# Patient Record
Sex: MALE | Race: OTHER | HISPANIC OR LATINO
[De-identification: names, ages, dates, MRNs, and addresses within clinical notes are randomized per-mention and may not be internally consistent; named-entity substitution may affect disease eponyms.]

---

## 2020-04-07 PROBLEM — Z00.00 ENCOUNTER FOR PREVENTIVE HEALTH EXAMINATION: Status: ACTIVE | Noted: 2020-04-07

## 2020-04-10 ENCOUNTER — APPOINTMENT (OUTPATIENT)
Dept: ORTHOPEDIC SURGERY | Facility: CLINIC | Age: 50
End: 2020-04-10
Payer: MEDICAID

## 2020-04-10 VITALS — BODY MASS INDEX: 36.16 KG/M2 | HEIGHT: 66 IN | WEIGHT: 225 LBS

## 2020-04-10 PROCEDURE — 73610 X-RAY EXAM OF ANKLE: CPT | Mod: RT

## 2020-04-10 PROCEDURE — 73562 X-RAY EXAM OF KNEE 3: CPT | Mod: RT

## 2020-04-10 PROCEDURE — 99204 OFFICE O/P NEW MOD 45 MIN: CPT

## 2020-04-10 PROCEDURE — 73590 X-RAY EXAM OF LOWER LEG: CPT | Mod: RT

## 2020-04-13 NOTE — ASSESSMENT
[FreeTextEntry1] : 49 year old male with isolated fibular fracture.  He did not have any medial clear space widening on external rotation stress ankle view and no ankle pain.  This helps rule out Maisonneuve injury.  He will continue protected weight bearing right lower extremity.  He will followup in 3-4 weeks.  He can take NSAIDs as needed for pain.  He knows to call with any questions or concerns or if his symptoms acutely worsen.

## 2020-04-13 NOTE — PHYSICAL EXAM
[de-identified] : General: No acute distress, conversant, well-nourished.\par Head: Normocephalic, atraumatic\par Neck: trachea midline, FROM\par Heart: normotensive and normal rate and rhythm\par Lungs: No labored breathing\par Skin: No abrasions, no rashes, no edema\par Psych: Alert and oriented to person, place and time\par Extremities: no peripheral edema or digital cyanosis\par Vascular: warm and well perfused distally, palpable distal pulses\par \par MSK:\par Right lower extremity\par TTP at lateral aspect of leg\par compartments soft and compressible\par no tenderness at ankle, no tenderness at medial aspect of ankle\par SILT m/l/1st DWS\par +motor EHL/FHL/TA\par WWP distally, palpbale PT and DP pulses [de-identified] : RIght tib/fib AP and lateral obtained in the office today shows comminuted midshaft fibula fracture with interval healing\par \par Right ankle AP, lateral, mortise and external rotation stress views show no fracture or dislocation.  No medial clear space widening.  No change in medial clear space with stress view.  \par \par Knee AP and lateral obtained in the office today shows no fracture or dislocation.  \par \par

## 2020-04-13 NOTE — HISTORY OF PRESENT ILLNESS
[de-identified] : 49 year old male presents for orthopedic evaluation for a right fibular fracture.  He said he tripped in the street 3 weeks ago and sustained the injury. He initially had ankle films at an urgent care and was told he had no fracture.  He returned to the urgent care and had tib/fib films showing an isolated fibula fracture.  The patient currently says he had no ankle pain. He says he has pain in the lateral aspect of his leg in the area of his fibula fracture. He says the pain and swelling have improved.

## 2020-05-26 ENCOUNTER — APPOINTMENT (OUTPATIENT)
Dept: ORTHOPEDIC SURGERY | Facility: CLINIC | Age: 50
End: 2020-05-26
Payer: MEDICAID

## 2020-05-26 PROCEDURE — 99213 OFFICE O/P EST LOW 20 MIN: CPT

## 2020-05-26 PROCEDURE — 73590 X-RAY EXAM OF LOWER LEG: CPT | Mod: RT

## 2020-05-26 PROCEDURE — 73610 X-RAY EXAM OF ANKLE: CPT | Mod: RT

## 2020-05-26 RX ORDER — BACLOFEN 10 MG/1
10 TABLET ORAL
Qty: 30 | Refills: 0 | Status: ACTIVE | COMMUNITY
Start: 2020-02-19

## 2020-05-26 RX ORDER — IBUPROFEN 800 MG/1
800 TABLET, FILM COATED ORAL
Qty: 90 | Refills: 0 | Status: ACTIVE | COMMUNITY
Start: 2020-02-04

## 2020-05-26 RX ORDER — LISINOPRIL 10 MG/1
10 TABLET ORAL
Qty: 30 | Refills: 0 | Status: ACTIVE | COMMUNITY
Start: 2020-01-26

## 2020-05-26 RX ORDER — GABAPENTIN 600 MG/1
600 TABLET, COATED ORAL
Qty: 180 | Refills: 0 | Status: ACTIVE | COMMUNITY
Start: 2020-05-14

## 2020-05-26 RX ORDER — ERGOCALCIFEROL 1.25 MG/1
1.25 MG CAPSULE, LIQUID FILLED ORAL
Qty: 4 | Refills: 0 | Status: ACTIVE | COMMUNITY
Start: 2019-12-03

## 2020-05-26 RX ORDER — CLINDAMYCIN HYDROCHLORIDE 300 MG/1
300 CAPSULE ORAL
Qty: 21 | Refills: 0 | Status: ACTIVE | COMMUNITY
Start: 2020-03-14

## 2020-05-26 RX ORDER — METFORMIN HYDROCHLORIDE 1000 MG/1
1000 TABLET, COATED ORAL
Qty: 60 | Refills: 0 | Status: ACTIVE | COMMUNITY
Start: 2020-01-26

## 2020-05-26 RX ORDER — SYRINGE, DISPOSABLE, 1 ML
25G X 5/8" SYRINGE, EMPTY DISPOSABLE MISCELLANEOUS
Qty: 30 | Refills: 0 | Status: ACTIVE | COMMUNITY
Start: 2019-12-03

## 2020-05-26 RX ORDER — PROPRANOLOL HYDROCHLORIDE 40 MG/1
40 TABLET ORAL
Qty: 60 | Refills: 0 | Status: ACTIVE | COMMUNITY
Start: 2020-01-26

## 2020-05-26 RX ORDER — DULOXETINE HYDROCHLORIDE 60 MG/1
60 CAPSULE, DELAYED RELEASE PELLETS ORAL
Qty: 60 | Refills: 0 | Status: ACTIVE | COMMUNITY
Start: 2020-05-10

## 2020-05-26 RX ORDER — IBUPROFEN 600 MG/1
600 TABLET, FILM COATED ORAL
Qty: 20 | Refills: 0 | Status: ACTIVE | COMMUNITY
Start: 2020-03-15

## 2020-05-26 RX ORDER — OMEPRAZOLE 20 MG/1
20 CAPSULE, DELAYED RELEASE ORAL
Qty: 30 | Refills: 0 | Status: ACTIVE | COMMUNITY
Start: 2020-01-24

## 2020-05-26 RX ORDER — CYCLOBENZAPRINE HYDROCHLORIDE 10 MG/1
10 TABLET, FILM COATED ORAL
Qty: 40 | Refills: 0 | Status: ACTIVE | COMMUNITY
Start: 2020-02-04

## 2020-05-27 NOTE — HISTORY OF PRESENT ILLNESS
[de-identified] : 49 year old male returns for followup with right comminuted fibular shaft fracture.  He says his pain has significantly improved.  He is not taking any medications for pain and is ambulating in his sneakers.  He says he does still have pain at the site of the fracture.

## 2020-05-27 NOTE — ASSESSMENT
[FreeTextEntry1] : 49 year old male with right fibular shaft fracture 9 weeks ago.  His pain has significantly improved and he is ambulating without much discomfort.  There is interval healing seen on his radiographs.  He will followup in 4 weeks.  He knows to call with any questions or concerns or if his symptoms acutely worsen.

## 2020-05-27 NOTE — PHYSICAL EXAM
[de-identified] : General: No acute distress, conversant, well-nourished.\par Head: Normocephalic, atraumatic\par Neck: trachea midline, FROM\par Heart: normotensive and normal rate and rhythm\par Lungs: No labored breathing\par Skin: No abrasions, no rashes, no edema\par Psych: Alert and oriented to person, place and time\par Extremities: no peripheral edema or digital cyanosis\par Vascular: warm and well perfused distally, palpable distal pulses\par \par MSK:\par Right lower extremity\par TTP at lateral aspect of leg at site of fracture\par compartments soft and compressible\par no tenderness at ankle, no tenderness at medial aspect of ankle\par SILT m/l/1st DWS\par +motor EHL/FHL/TA\par WWP distally, palpbale PT and DP pulses [de-identified] : RIght tib/fib AP and lateral obtained in the office today shows comminuted midshaft fibula fracture with interval healing compared with films from 4/10/20.  \par \par Right ankle AP, lateral, mortise and views show no fracture or dislocation.  No increase in medial clear space compared with films from 4/10/20.

## 2020-07-07 ENCOUNTER — APPOINTMENT (OUTPATIENT)
Dept: ORTHOPEDIC SURGERY | Facility: CLINIC | Age: 50
End: 2020-07-07
Payer: MEDICAID

## 2020-07-07 VITALS — TEMPERATURE: 94.1 F

## 2020-07-07 PROCEDURE — 73590 X-RAY EXAM OF LOWER LEG: CPT | Mod: RT

## 2020-07-07 PROCEDURE — 73610 X-RAY EXAM OF ANKLE: CPT | Mod: RT

## 2020-07-07 PROCEDURE — 99213 OFFICE O/P EST LOW 20 MIN: CPT

## 2020-07-08 NOTE — PHYSICAL EXAM
[de-identified] : General: No acute distress, conversant, well-nourished.\par Head: Normocephalic, atraumatic\par Neck: trachea midline, FROM\par Heart: normotensive and normal rate and rhythm\par Lungs: No labored breathing\par Skin: No abrasions, no rashes, no edema\par Psych: Alert and oriented to person, place and time\par Extremities: no peripheral edema or digital cyanosis\par Vascular: warm and well perfused distally, palpable distal pulses\par \par MSK:\par Right lower extremity\par mild tenderness at lateral aspect of leg at site of fracture\par compartments soft and compressible\par no tenderness at ankle, no tenderness at medial aspect of ankle\par SILT m/l/1st DWS\par +motor EHL/FHL/TA\par WWP distally, palpbale PT and DP pulses [de-identified] : RIght tib/fib AP and lateral obtained in the office today shows comminuted midshaft fibula fracture with interval healing and additional callus compared with films from 5/26/20.  \par \par Right ankle AP, lateral, mortise and views show no fracture or dislocation.  No increase in medial clear space compared with films from 5/26/2020.

## 2020-07-08 NOTE — ASSESSMENT
[FreeTextEntry1] : 49 year old male with right fibular shaft fracture 3.5 months ago.  He has no pain with regular ambulation.  He says he has discomfort when he tries to run.  There is progression of callus and healing on radiographs.  He can continue to advance activity as tolerated.  He is not taking any medication for pain.  He will followup in 2 months.  He knows to call with any questions or concerns or if his symptoms acutely worsen.

## 2020-07-08 NOTE — HISTORY OF PRESENT ILLNESS
[de-identified] : 49 year old male returns for followup with right comminuted fibular shaft fracture.  He has rare discomfort at his fracture site. He has no pain when he is walking but says he feels the discomfort if he tries to run. He is not taking any medications.  He is not limited in his activities of daily living or walking distance.

## 2020-09-29 ENCOUNTER — APPOINTMENT (OUTPATIENT)
Dept: ORTHOPEDIC SURGERY | Facility: CLINIC | Age: 50
End: 2020-09-29
Payer: MEDICAID

## 2020-09-29 DIAGNOSIS — S89.90XA UNSPECIFIED INJURY OF UNSPECIFIED LOWER LEG, INITIAL ENCOUNTER: ICD-10-CM

## 2020-09-29 PROCEDURE — 73610 X-RAY EXAM OF ANKLE: CPT | Mod: RT

## 2020-09-29 PROCEDURE — 99213 OFFICE O/P EST LOW 20 MIN: CPT

## 2020-09-29 PROCEDURE — 73590 X-RAY EXAM OF LOWER LEG: CPT | Mod: RT

## 2020-10-03 NOTE — PHYSICAL EXAM
[de-identified] : General: No acute distress, conversant, well-nourished.\par Head: Normocephalic, atraumatic\par Neck: trachea midline, FROM\par Heart: normotensive and normal rate and rhythm\par Lungs: No labored breathing\par Skin: No abrasions, no rashes, no edema\par Psych: Alert and oriented to person, place and time\par Extremities: no peripheral edema or digital cyanosis\par Vascular: warm and well perfused distally, palpable distal pulses\par \par MSK:\par Right lower extremity\par No tenderness at lateral aspect of leg at site of fracture\par +Tenderness at ankle\par SILT m/l/1st DWS\par +motor EHL/FHL/TA\par WWP distally, palpbale PT and DP pulses [de-identified] : RIght tib/fib AP and lateral obtained in the office today shows comminuted midshaft fibula fracture with interval healing and additional callus compared with films from 7/7/20.  \par \par Right ankle AP, lateral, mortise and views show no fracture or dislocation.  No increase in medial clear space compared with films from 7/7/2020.

## 2020-10-03 NOTE — ASSESSMENT
[FreeTextEntry1] : 49 year old male with right ankle pain and history of right fibular shaft fracture.  Patient will be sent for a MRI of his right ankle to evaluate for syndesmotic injury. Patient will followup once his MRI has been obtained.  He knows to call with any questions or concerns or if his symptoms acutely worsen.

## 2020-10-03 NOTE — HISTORY OF PRESENT ILLNESS
[de-identified] : 49 year old male returns for followup with R comminuted fibular shaft fracture. Patient reports pain in his ankle with ambulation.  The pain has recently increased.  He is walking without gait aid and with sneakers.  Rest helps. OTC NSAIDs helps.

## 2020-10-13 ENCOUNTER — APPOINTMENT (OUTPATIENT)
Dept: ORTHOPEDIC SURGERY | Facility: CLINIC | Age: 50
End: 2020-10-13
Payer: MEDICAID

## 2020-10-13 PROCEDURE — 73610 X-RAY EXAM OF ANKLE: CPT | Mod: RT

## 2020-10-13 PROCEDURE — 73590 X-RAY EXAM OF LOWER LEG: CPT | Mod: RT

## 2020-10-13 PROCEDURE — 99213 OFFICE O/P EST LOW 20 MIN: CPT

## 2020-10-17 NOTE — PHYSICAL EXAM
[de-identified] : General: No acute distress, conversant, well-nourished.\par Head: Normocephalic, atraumatic\par Neck: trachea midline, FROM\par Heart: normotensive and normal rate and rhythm\par Lungs: No labored breathing\par Skin: No abrasions, no rashes, no edema\par Psych: Alert and oriented to person, place and time\par Extremities: no peripheral edema or digital cyanosis\par Vascular: warm and well perfused distally, palpable distal pulses\par \par MSK:\par Right lower extremity\par No tenderness at lateral aspect of leg at site of fracture\par +Tenderness at ankle\par SILT m/l/1st DWS\par +motor EHL/FHL/TA\par WWP distally, palpbale PT and DP pulses [de-identified] : MRI R ankle (10/8/20): Low grade partial tear of AITFL and PITLF.  Intermediate partial tear of deltoid ligament.  Healing small avulsion of posterior malleolus. Partial tear of posterior tibial tendon.  Osteochondral abnormality in superomedial talar dome, likely impaction injury.  Mild Achilles insertion tendinosis.  Ankle osteoarthritis.  \par \par RIght tib/fib AP and lateral obtained in the office today shows comminuted midshaft fibula fracture with interval healing\par \par Right ankle AP, lateral, mortise and external rotation stress views obtained in the office today show no fracture or dislocation.  No increase in medial clear space with external rotation view.

## 2020-10-17 NOTE — ASSESSMENT
[FreeTextEntry1] : 49 year old male with right ankle pain and history of right fibular shaft fracture. Patient reports significant improvement in his ankle pain. We discussed the findings of his R ankle MRI.  We discussed immobilization, surgical intervention.  At this time since the patient's symptoms have significantly improved we will proceed with PT.  He will followup in 4-6 weeks. If he fails to improve he may require surgical treatment.  He knows to call with any questions or concerns or if his symptoms acutely worsen.

## 2020-10-17 NOTE — HISTORY OF PRESENT ILLNESS
[de-identified] : 49 year old male returns for followup with R comminuted fibular shaft fracture. Since his last visit he says his ankle pain has improved.  He is ambulating well.  He is here to review his recent ankle MRI.  He is not taking any medications for pain.

## 2020-11-24 ENCOUNTER — APPOINTMENT (OUTPATIENT)
Dept: ORTHOPEDIC SURGERY | Facility: CLINIC | Age: 50
End: 2020-11-24

## 2021-01-20 ENCOUNTER — RX RENEWAL (OUTPATIENT)
Age: 51
End: 2021-01-20

## 2021-01-20 RX ORDER — MELOXICAM 15 MG/1
15 TABLET ORAL
Qty: 30 | Refills: 1 | Status: ACTIVE | COMMUNITY
Start: 2020-10-13 | End: 1900-01-01

## 2021-02-10 ENCOUNTER — APPOINTMENT (OUTPATIENT)
Dept: ORTHOPEDIC SURGERY | Facility: CLINIC | Age: 51
End: 2021-02-10
Payer: COMMERCIAL

## 2021-02-10 DIAGNOSIS — S82.409A UNSPECIFIED FRACTURE OF SHAFT OF UNSPECIFIED FIBULA, INITIAL ENCOUNTER FOR CLOSED FRACTURE: ICD-10-CM

## 2021-02-10 DIAGNOSIS — M25.571 PAIN IN RIGHT ANKLE AND JOINTS OF RIGHT FOOT: ICD-10-CM

## 2021-02-10 PROCEDURE — 99072 ADDL SUPL MATRL&STAF TM PHE: CPT

## 2021-02-10 PROCEDURE — 99214 OFFICE O/P EST MOD 30 MIN: CPT | Mod: 25

## 2021-02-10 PROCEDURE — 73562 X-RAY EXAM OF KNEE 3: CPT | Mod: LT

## 2021-02-10 PROCEDURE — 20610 DRAIN/INJ JOINT/BURSA W/O US: CPT | Mod: LT

## 2021-02-10 RX ORDER — MELOXICAM 15 MG/1
15 TABLET ORAL
Qty: 30 | Refills: 0 | Status: ACTIVE | COMMUNITY
Start: 2021-02-10 | End: 1900-01-01

## 2021-02-11 NOTE — PROCEDURE
[de-identified] : Procedure: Left Knee Joint injection with corticosteroid\par Pre-Procedure Diagnosis: Left knee pain\par Post-Procedure Diagnosis: same\par \par The patient was educated about the risks and benefits of a corticosteroid injection.  Alternatives were discussed.  The patient understood and consented for the procedure.\par \par The area was sterilely prepped using isopropyl alcohol.  An ethyl chloride spray provided  local anesthesia.  Using the usual sterile technique, 1 ml of 40mg/1ml of Kenalog and 4 ml of Lidocaine 1% without epinephrine was injected into the joint.  A dressing was applied to the area.  The patient tolerated the procedure well and without complication.\par

## 2021-02-11 NOTE — ASSESSMENT
[FreeTextEntry1] : 50 year old male with previous right fibula fracture now presenting with left knee pain.  His right lower extremity pain has completely resolved.  He is now having left knee pain and has a history of left knee arthroscopic surgery. His pain has worsened.  He was given a prescription for Meloxicam.  He was given a referral for physical therapy.  He was given a left knee corticosteroid injection.  He will be sent for a left knee MRI.  He will followup once the MRI has been completed.  He knows to call with any questions or concerns or if his symptoms acutely worsen.

## 2021-02-11 NOTE — HISTORY OF PRESENT ILLNESS
[de-identified] : 49 year old male previously seen for R comminuted fibula fracture presents with new issue of left knee pain. He says his right lower extremity pain has resolved.  He has no ankle pain.  He reports the physical therapy was very successful.  Since his right leg pain improved he has been trying to get back into exercise and has been running.  Since then he has been having worsened left knee pain.  He reports a previous surgery in his left knee in [place], [place].  It was over 4 years ago.  It was an arthroscopic surgery and was a partial meniscectomy.  He also says they transferred some cartilage onto his patella.  Since the surgery he has continued to have knee pain but it got worse now that he is trying to get back to running.  He denies locking symptoms or instability.  He is not taking any medications for the pain.

## 2021-02-11 NOTE — PHYSICAL EXAM
[de-identified] : General: No acute distress, conversant, well-nourished.\par Head: Normocephalic, atraumatic\par Neck: trachea midline, FROM\par Heart: normotensive and normal rate and rhythm\par Lungs: No labored breathing\par Skin: No abrasions, no rashes, no edema\par Psych: Alert and oriented to person, place and time\par Extremities: no peripheral edema or digital cyanosis\par Vascular: warm and well perfused distally, palpable distal pulses\par \par MSK:\par Left Knee with no erythema, no effusion.  \par + medial joint line tenderness.\par No lateral joint line tenderness.\par \par Range of motion: 0 - 130 degrees\par Pain with range of motion.\par \par Negative Rajinder's test.\par Stable to varus and valgus stress.\par Negative Lachman's test, negative anterior and posterior drawer tests.  \par \par Sensation intact to light touch.  \par Normal motor exam.\par Warm and well perfused distally.\par \par Right lower extremity\par No tenderness at lateral aspect of leg at site of fracture\par No tenderness at ankle\par Stable to stress\par SILT m/l/1st DWS\par +motor EHL/FHL/TA\par WWP distally, palpbale PT and DP pulses [de-identified] : Left knee AP, lateral, sunrise radiographs obtained in the office today shows no fracture or dislocation.  Mild degenerative changes in medial compartment. \par

## 2021-02-24 ENCOUNTER — APPOINTMENT (OUTPATIENT)
Dept: ORTHOPEDIC SURGERY | Facility: CLINIC | Age: 51
End: 2021-02-24
Payer: COMMERCIAL

## 2021-02-24 DIAGNOSIS — M25.562 PAIN IN LEFT KNEE: ICD-10-CM

## 2021-02-24 PROCEDURE — 99213 OFFICE O/P EST LOW 20 MIN: CPT | Mod: 95

## 2021-02-25 PROBLEM — M25.562 LEFT KNEE PAIN: Status: ACTIVE | Noted: 2021-02-10

## 2021-02-25 NOTE — PHYSICAL EXAM
[de-identified] : General: NAD AAOx4, well-appearing\par Respiratory: No visible respiratory distress\par Psych: Good mood and appropriate affect\par Skin: WWP, no rashes\par Gait: Normal gait, can do tandem gait\par MSK:\par Left knee: patient indicates mild discomfort, no visible erythema, no visible effusion, full ROM\par Neuro: Patient denies numbness, grossly moving all extremities. [de-identified] : Left knee MRI: (2/20/21): 1.  Osteochondral plug central aspect of the medial femoral condyle with small full-thickness cartilage defect with underlying incongruity and small protrusion of the osseous portion of the osteochondral plug.  Otherwise, the reparative cartilage lies relatively flush with the native cartilage. Correlation with prior surgical history and comparison with available prior postoperative MRI are recommended.\par 2.  Small cartilage defect with underlying depression and bone marrow signal abnormality superior aspect of the medial trochlear facet compatible with osteochondral plug donor site. \par 3.  Sprain (grade 1 injury) of the MCL.\par 4.  Small joint effusion. Small popliteal cyst.\par 5.  Blunting of the inner edge of the body of the partially extruded medial meniscus and intrasubstance degeneration compatible with prior partial meniscectomy. Intact anterior and posterior horns.\par 6.  Intact lateral meniscus.

## 2021-02-25 NOTE — HISTORY OF PRESENT ILLNESS
[de-identified] : This visit was provided by the [facility] telemedicine service.  This telehealth appointment was conducted using real-time 2-way audiovisual technology.  The patient Joe Blount was at his home during the time of the visit. The provider, Rainer Saunders was located at his office at [address] [place] [address] at the time of the visit.  The patient and Rainer Saunders participated in the telehealth encounter.  Verbal consent was given on February 24, 2021 by the patient.\par \par HPI:\par Telehealth via [facility] telemedicine service: 20 minutes\par \par 49 year old male followup for left knee pain.  He has a history of left knee arthroscopy 4 years ago in [place], [place] with a partial medial meniscectomy and osteochondral autograft.  Since his last visit he has significant improvement in symptoms after a corticosteroid injection.  He is not taking any medication for pain.  He has no pain in his right lower extremity and has finished with PT for the leg.  He is here to review his left knee MRI.

## 2021-02-25 NOTE — PHYSICAL EXAM
[de-identified] : General: NAD AAOx4, well-appearing\par Respiratory: No visible respiratory distress\par Psych: Good mood and appropriate affect\par Skin: WWP, no rashes\par Gait: Normal gait, can do tandem gait\par MSK:\par Left knee: patient indicates mild discomfort, no visible erythema, no visible effusion, full ROM\par Neuro: Patient denies numbness, grossly moving all extremities. [de-identified] : Left knee MRI: (2/20/21): 1.  Osteochondral plug central aspect of the medial femoral condyle with small full-thickness cartilage defect with underlying incongruity and small protrusion of the osseous portion of the osteochondral plug.  Otherwise, the reparative cartilage lies relatively flush with the native cartilage. Correlation with prior surgical history and comparison with available prior postoperative MRI are recommended.\par 2.  Small cartilage defect with underlying depression and bone marrow signal abnormality superior aspect of the medial trochlear facet compatible with osteochondral plug donor site. \par 3.  Sprain (grade 1 injury) of the MCL.\par 4.  Small joint effusion. Small popliteal cyst.\par 5.  Blunting of the inner edge of the body of the partially extruded medial meniscus and intrasubstance degeneration compatible with prior partial meniscectomy. Intact anterior and posterior horns.\par 6.  Intact lateral meniscus.

## 2021-02-25 NOTE — ASSESSMENT
[FreeTextEntry1] : 50 year old male with h/o of right comminuted fibula fracture followup with left knee pain.  He has no pain in his right lower extremities.  Since his last visit his left knee pain has significantly improved after a corticosteroid injection.  We reviewed his left knee MRI which showed changes consistent with the patient's previous partial medial meniscectomy and mosaicplasty.  He was given a referral for physical therapy of his left knee.  He will followup in 4-6 weeks.  He knows to call with any questions or concerns or if his symptoms acutely worsen.

## 2021-02-25 NOTE — HISTORY OF PRESENT ILLNESS
[de-identified] : This visit was provided by the [facility] telemedicine service.  This telehealth appointment was conducted using real-time 2-way audiovisual technology.  The patient Joe Blount was at his home during the time of the visit. The provider, Rainer Saunders was located at his office at [address] [place] [address] at the time of the visit.  The patient and Rainer Saunedrs participated in the telehealth encounter.  Verbal consent was given on February 24, 2021 by the patient.\par \par HPI:\par Telehealth via [facility] telemedicine service: 20 minutes\par \par 49 year old male followup for left knee pain.  He has a history of left knee arthroscopy 4 years ago in [place], [place] with a partial medial meniscectomy and osteochondral autograft.  Since his last visit he has significant improvement in symptoms after a corticosteroid injection.  He is not taking any medication for pain.  He has no pain in his right lower extremity and has finished with PT for the leg.  He is here to review his left knee MRI.

## 2024-03-17 ENCOUNTER — EMERGENCY (EMERGENCY)
Facility: HOSPITAL | Age: 54
LOS: 1 days | Discharge: HOME | End: 2024-03-17
Attending: EMERGENCY MEDICINE | Admitting: EMERGENCY MEDICINE
Payer: COMMERCIAL

## 2024-03-17 VITALS
WEIGHT: 229.94 LBS | RESPIRATION RATE: 16 BRPM | DIASTOLIC BLOOD PRESSURE: 84 MMHG | HEIGHT: 66 IN | SYSTOLIC BLOOD PRESSURE: 121 MMHG | HEART RATE: 76 BPM | OXYGEN SATURATION: 98 % | TEMPERATURE: 98 F

## 2024-03-17 PROCEDURE — 99284 EMERGENCY DEPT VISIT MOD MDM: CPT

## 2024-03-17 RX ORDER — ACETAMINOPHEN 500 MG
975 TABLET ORAL ONCE
Refills: 0 | Status: COMPLETED | OUTPATIENT
Start: 2024-03-17 | End: 2024-03-17

## 2024-03-17 RX ORDER — LIDOCAINE 4 G/100G
1 CREAM TOPICAL ONCE
Refills: 0 | Status: COMPLETED | OUTPATIENT
Start: 2024-03-17 | End: 2024-03-17

## 2024-03-17 NOTE — ED PROVIDER NOTE - NS ED ATTENDING STATEMENT MOD
I have seen and examined this patient and fully participated in the care of this patient as the teaching attending.  The service was shared with the TAYLOR.  I reviewed and verified the documentation.
irregular rate and rhythm/positive S1/positive S2

## 2024-03-17 NOTE — ED PROVIDER NOTE - CLINICAL SUMMARY MEDICAL DECISION MAKING FREE TEXT BOX
53M PMH DM on insulin, HTN, HLD, alcohol use disorder (has not drank for 3 weeks now sp rehab) presenting to the emergency department with 2 days of left lower extremity pain and paresthesias, no associated weakness, patient able to ambulate, patient reports 2 days of worsening erythematous rash at lower abdomen beneath skin fold, no associated fever.  Patient also reports he has not taken his insulin within the past few days. Given hx and physical, ddx includes but is not limited to fungal infection, neuropathy, radiculopathy, hyperglycemia, DKA, metabolic derangement, low concern for DVT (no leg edema or erythema). Plan for ecg, labs, meds, ua, reassess

## 2024-03-17 NOTE — ED ADULT TRIAGE NOTE - CHIEF COMPLAINT QUOTE
Pt. walk in c/o tingling to L. leg and numbness to L. foot after twisting it while getting out of bed 2 days ago. Also c/o rash to lower abd. PMH HTN, DM, ETOH abuse.

## 2024-03-17 NOTE — ED PROVIDER NOTE - PROGRESS NOTE DETAILS
Aleksandra Pedraza DO (PGY3): Patient's D-dimer elevated but when adjusted for age is not above the threshold. Pt to return tomorrow for ultrasound for further eval of blood clot. Pt offered CT lumbar spine to eval for radiculopathy given L foot numbness, but declined states L foot numbness is chronic and due to his neuropathy. Discussed risks of forgoing CT, pt verbalized understanding and declined. Pt without back pain red flags at this time. Ambulating in ED without assistance. Strength intact. No urinary/fecal incontinence. Pt made aware of lab and imaging results, including incidental findings. Questions regarding their symptoms were addressed. Advised to follow up with pcp regarding diabetes and return to ED for wound check of skin, US, and CT lumbar spine. Given strict return precautions. Pt verbalized understanding.

## 2024-03-17 NOTE — ED ADULT NURSE NOTE - NSFALLUNIVINTERV_ED_ALL_ED
Bed/Stretcher in lowest position, wheels locked, appropriate side rails in place/Call bell, personal items and telephone in reach/Instruct patient to call for assistance before getting out of bed/chair/stretcher/Non-slip footwear applied when patient is off stretcher/[place] to call system/Physically safe environment - no spills, clutter or unnecessary equipment/Purposeful proactive rounding/Room/bathroom lighting operational, light cord in reach

## 2024-03-17 NOTE — ED PROVIDER NOTE - PHYSICAL EXAMINATION
GENERAL: Awake. Alert. NAD. Well nourished.  HEENT: NC/AT, PERRL, EOMI, Conjunctiva pink, no scleral icterus. Airway patent.   LUNGS: CTAB. No wheezes or rales noted.  CARDIAC: Chest non-tender to palpation. RRR.  BACK: No midline spinal tenderness  EXT: No edema, no calf tenderness, distal pulses 2+ bilaterally  NEURO: A&Ox3. Moving all extremities. Sensation and strength intact throughout. No focal deficits. Normal gait.  SKIN: Warm and dry. Erythematous rash at lower abdominal skin fold and groin.   PSYCH: Normal affect. GENERAL: Awake. Alert. NAD. Well nourished.  HEENT: NC/AT, PERRL, EOMI, Conjunctiva pink, no scleral icterus. Airway patent.   LUNGS: CTAB. No wheezes or rales noted.  CARDIAC: Chest non-tender to palpation. RRR.  BACK: No midline spinal tenderness  EXT: No edema, no calf tenderness, distal pulses 2+ bilaterally  NEURO: A&Ox3. Moving all extremities. Sensation and strength intact throughout. No focal deficits. Normal gait.  SKIN: Warm and dry. Erythematous rash at lower abdominal skin fold and groin, no crepitus. No involvement of testicular region or perineum.   PSYCH: Normal affect.

## 2024-03-17 NOTE — ED PROVIDER NOTE - PATIENT PORTAL LINK FT
You can access the FollowMyHealth Patient Portal offered by [facility] by registering at the following website: [email or web address]. By joining [facility]’s FollowMyHealth portal, you will also be able to view your health information using other applications (apps) compatible with our system.

## 2024-03-17 NOTE — ED PROVIDER NOTE - CARE PLAN
Principal Discharge DX:	Candida infection  Secondary Diagnosis:	Cellulitis  Secondary Diagnosis:	Diabetic neuropathy   1

## 2024-03-17 NOTE — ED PROVIDER NOTE - NSFOLLOWUPINSTRUCTIONS_ED_ALL_ED_FT
Antibiotics and diflucan were sent to your pharmacy, please take them as prescribed.  Please return for worsening symptoms such as those listed below.    Cellulitis    Cellulitis is a skin infection caused by bacteria. This condition occurs most often in the arms and lower legs but can occur anywhere over the body. Symptoms include redness, swelling, warm skin, tenderness, and chills/fever. If you were prescribed an antibiotic medicine, take it as told by your health care provider. Do not stop taking the antibiotic even if you start to feel better.    SEEK IMMEDIATE MEDICAL CARE IF YOU HAVE ANY OF THE FOLLOWING SYMPTOMS: worsening fever, red streaks coming from affected area, vomiting or diarrhea, or dizziness/lightheadedness.

## 2024-03-17 NOTE — ED PROVIDER NOTE - OBJECTIVE STATEMENT
53M PMH DM on insulin, HTN, HLD, alcohol use disorder (has not drank for 3 weeks now sp rehab) presenting to the emergency department with 2 days of left lower extremity pain and paresthesias, no associated weakness, patient able to ambulate, patient reports 2 days of worsening erythematous rash at lower abdomen beneath skin fold, no associated fever.  Patient also reports he has not taken his insulin within the past few days.  Denies chest pain, cough, SOB, urinary fecal incontinence.

## 2024-03-17 NOTE — ED PROVIDER NOTE - ATTENDING CONTRIBUTION TO CARE
I have discussed the case with the resident/mid level provider. I have personally performed a history, physical exam, and my own medical decision making. I have reviewed the note and agree with the findings and plan     Patient is seen at bedside he has a history of insulin-dependent diabetes with which she is not compliant with insulin and he endorses he is currently going to be changed to Mounjaro, patient has history of ex alcohol dependence on naltrexone and also takes metformin he follows closely with a primary care doctor and also has a podiatrist has longstanding diabetic neuropathy that has caused paresthesias and numbness in the left lower extremity and per patient in the last 6 months his diabetic neuropathy in the left lower extremity has been well-controlled but in the last 2 days has been getting increasing paresthesias and numbness to leg also has posterior left calf pain no swelling.  Patient also notes an itchy burning rash to the lower abdominal pannus which is consistent with candidal infection there is no crepitus there is no necrotic tissue to suggest Criss's.  Patient has negative lactate no white count afebrile and nontoxic-appearing.  After long discussion we explained that the D-dimer is age corrected normal but that he should return for Doppler of his left lower extremity also to start him on antibiotics and give him Diflucan.  Patient will return in the morning for dedicated ultrasound of his left lower extremity.  Being that the age corrected D-dimer is negative will hold off on Lovenox.  Patient offered to be placed on observation but patient declined and would prefer to go home and come back in the morning.  After the patient left I also called him and to endorse that ultrasound will not be back until 10 AM this morning.

## 2024-03-18 ENCOUNTER — INPATIENT (INPATIENT)
Facility: HOSPITAL | Age: 54
LOS: 1 days | Discharge: HOME | DRG: 607 | End: 2024-03-20
Attending: STUDENT IN AN ORGANIZED HEALTH CARE EDUCATION/TRAINING PROGRAM | Admitting: STUDENT IN AN ORGANIZED HEALTH CARE EDUCATION/TRAINING PROGRAM
Payer: COMMERCIAL

## 2024-03-18 ENCOUNTER — EMERGENCY (EMERGENCY)
Facility: HOSPITAL | Age: 54
LOS: 1 days | Discharge: LEFT BEFORE DISCHARGE | End: 2024-03-18
Attending: EMERGENCY MEDICINE | Admitting: EMERGENCY MEDICINE
Payer: COMMERCIAL

## 2024-03-18 VITALS
RESPIRATION RATE: 15 BRPM | TEMPERATURE: 98 F | OXYGEN SATURATION: 97 % | SYSTOLIC BLOOD PRESSURE: 156 MMHG | HEART RATE: 88 BPM | DIASTOLIC BLOOD PRESSURE: 89 MMHG

## 2024-03-18 VITALS
DIASTOLIC BLOOD PRESSURE: 86 MMHG | OXYGEN SATURATION: 98 % | HEART RATE: 66 BPM | SYSTOLIC BLOOD PRESSURE: 120 MMHG | TEMPERATURE: 98 F | RESPIRATION RATE: 16 BRPM

## 2024-03-18 VITALS
SYSTOLIC BLOOD PRESSURE: 118 MMHG | DIASTOLIC BLOOD PRESSURE: 82 MMHG | TEMPERATURE: 98 F | HEIGHT: 66 IN | HEART RATE: 82 BPM | RESPIRATION RATE: 18 BRPM | OXYGEN SATURATION: 98 %

## 2024-03-18 VITALS
DIASTOLIC BLOOD PRESSURE: 71 MMHG | SYSTOLIC BLOOD PRESSURE: 117 MMHG | TEMPERATURE: 98 F | HEART RATE: 65 BPM | RESPIRATION RATE: 16 BRPM | OXYGEN SATURATION: 96 % | HEIGHT: 66 IN

## 2024-03-18 LAB
ALBUMIN SERPL ELPH-MCNC: 3 G/DL — LOW (ref 3.4–5)
ALBUMIN SERPL ELPH-MCNC: 3.3 G/DL — LOW (ref 3.4–5)
ALP SERPL-CCNC: 58 U/L — SIGNIFICANT CHANGE UP (ref 40–120)
ALP SERPL-CCNC: 65 U/L — SIGNIFICANT CHANGE UP (ref 40–120)
ALT FLD-CCNC: 27 U/L — SIGNIFICANT CHANGE UP (ref 12–42)
ALT FLD-CCNC: 28 U/L — SIGNIFICANT CHANGE UP (ref 12–42)
ANION GAP SERPL CALC-SCNC: 10 MMOL/L — SIGNIFICANT CHANGE UP (ref 9–16)
ANION GAP SERPL CALC-SCNC: 8 MMOL/L — LOW (ref 9–16)
APPEARANCE UR: CLEAR — SIGNIFICANT CHANGE UP
AST SERPL-CCNC: 30 U/L — SIGNIFICANT CHANGE UP (ref 15–37)
AST SERPL-CCNC: 39 U/L — HIGH (ref 15–37)
BASOPHILS # BLD AUTO: 0.03 K/UL — SIGNIFICANT CHANGE UP (ref 0–0.2)
BASOPHILS # BLD AUTO: 0.04 K/UL — SIGNIFICANT CHANGE UP (ref 0–0.2)
BASOPHILS # BLD AUTO: 0.04 K/UL — SIGNIFICANT CHANGE UP (ref 0–0.2)
BASOPHILS NFR BLD AUTO: 0.2 % — SIGNIFICANT CHANGE UP (ref 0–2)
BASOPHILS NFR BLD AUTO: 0.3 % — SIGNIFICANT CHANGE UP (ref 0–2)
BASOPHILS NFR BLD AUTO: 0.3 % — SIGNIFICANT CHANGE UP (ref 0–2)
BILIRUB SERPL-MCNC: 0.3 MG/DL — SIGNIFICANT CHANGE UP (ref 0.2–1.2)
BILIRUB SERPL-MCNC: 0.3 MG/DL — SIGNIFICANT CHANGE UP (ref 0.2–1.2)
BILIRUB UR-MCNC: NEGATIVE — SIGNIFICANT CHANGE UP
BUN SERPL-MCNC: 13 MG/DL — SIGNIFICANT CHANGE UP (ref 7–23)
BUN SERPL-MCNC: 15 MG/DL — SIGNIFICANT CHANGE UP (ref 7–23)
CALCIUM SERPL-MCNC: 9 MG/DL — SIGNIFICANT CHANGE UP (ref 8.5–10.5)
CALCIUM SERPL-MCNC: 9.2 MG/DL — SIGNIFICANT CHANGE UP (ref 8.5–10.5)
CHLORIDE SERPL-SCNC: 105 MMOL/L — SIGNIFICANT CHANGE UP (ref 96–108)
CHLORIDE SERPL-SCNC: 106 MMOL/L — SIGNIFICANT CHANGE UP (ref 96–108)
CO2 SERPL-SCNC: 25 MMOL/L — SIGNIFICANT CHANGE UP (ref 22–31)
CO2 SERPL-SCNC: 29 MMOL/L — SIGNIFICANT CHANGE UP (ref 22–31)
COLOR SPEC: SIGNIFICANT CHANGE UP
CREAT SERPL-MCNC: 0.97 MG/DL — SIGNIFICANT CHANGE UP (ref 0.5–1.3)
CREAT SERPL-MCNC: 1.13 MG/DL — SIGNIFICANT CHANGE UP (ref 0.5–1.3)
D DIMER BLD IA.RAPID-MCNC: 336 NG/ML DDU — HIGH
DIFF PNL FLD: NEGATIVE — SIGNIFICANT CHANGE UP
EGFR: 78 ML/MIN/1.73M2 — SIGNIFICANT CHANGE UP
EGFR: 94 ML/MIN/1.73M2 — SIGNIFICANT CHANGE UP
EOSINOPHIL # BLD AUTO: 0.5 K/UL — SIGNIFICANT CHANGE UP (ref 0–0.5)
EOSINOPHIL # BLD AUTO: 0.53 K/UL — HIGH (ref 0–0.5)
EOSINOPHIL # BLD AUTO: 0.67 K/UL — HIGH (ref 0–0.5)
EOSINOPHIL NFR BLD AUTO: 4 % — SIGNIFICANT CHANGE UP (ref 0–6)
EOSINOPHIL NFR BLD AUTO: 4.2 % — SIGNIFICANT CHANGE UP (ref 0–6)
EOSINOPHIL NFR BLD AUTO: 4.5 % — SIGNIFICANT CHANGE UP (ref 0–6)
GLUCOSE SERPL-MCNC: 106 MG/DL — HIGH (ref 70–99)
GLUCOSE SERPL-MCNC: 98 MG/DL — SIGNIFICANT CHANGE UP (ref 70–99)
GLUCOSE UR QL: NEGATIVE MG/DL — SIGNIFICANT CHANGE UP
HCT VFR BLD CALC: 46.4 % — SIGNIFICANT CHANGE UP (ref 39–50)
HCT VFR BLD CALC: 46.9 % — SIGNIFICANT CHANGE UP (ref 39–50)
HCT VFR BLD CALC: 48.6 % — SIGNIFICANT CHANGE UP (ref 39–50)
HGB BLD-MCNC: 15.4 G/DL — SIGNIFICANT CHANGE UP (ref 13–17)
HGB BLD-MCNC: 15.5 G/DL — SIGNIFICANT CHANGE UP (ref 13–17)
HGB BLD-MCNC: 16.2 G/DL — SIGNIFICANT CHANGE UP (ref 13–17)
IMM GRANULOCYTES NFR BLD AUTO: 0.3 % — SIGNIFICANT CHANGE UP (ref 0–0.9)
IMM GRANULOCYTES NFR BLD AUTO: 0.3 % — SIGNIFICANT CHANGE UP (ref 0–0.9)
IMM GRANULOCYTES NFR BLD AUTO: 0.4 % — SIGNIFICANT CHANGE UP (ref 0–0.9)
KETONES UR-MCNC: ABNORMAL MG/DL
LACTATE BLDV-MCNC: 0.8 MMOL/L — SIGNIFICANT CHANGE UP (ref 0.5–2)
LEUKOCYTE ESTERASE UR-ACNC: NEGATIVE — SIGNIFICANT CHANGE UP
LYMPHOCYTES # BLD AUTO: 18.5 % — SIGNIFICANT CHANGE UP (ref 13–44)
LYMPHOCYTES # BLD AUTO: 2.33 K/UL — SIGNIFICANT CHANGE UP (ref 1–3.3)
LYMPHOCYTES # BLD AUTO: 2.91 K/UL — SIGNIFICANT CHANGE UP (ref 1–3.3)
LYMPHOCYTES # BLD AUTO: 21.9 % — SIGNIFICANT CHANGE UP (ref 13–44)
LYMPHOCYTES # BLD AUTO: 23.2 % — SIGNIFICANT CHANGE UP (ref 13–44)
LYMPHOCYTES # BLD AUTO: 3.22 K/UL — SIGNIFICANT CHANGE UP (ref 1–3.3)
MCHC RBC-ENTMCNC: 32 PG — SIGNIFICANT CHANGE UP (ref 27–34)
MCHC RBC-ENTMCNC: 32.4 PG — SIGNIFICANT CHANGE UP (ref 27–34)
MCHC RBC-ENTMCNC: 32.6 PG — SIGNIFICANT CHANGE UP (ref 27–34)
MCHC RBC-ENTMCNC: 32.8 GM/DL — SIGNIFICANT CHANGE UP (ref 32–36)
MCHC RBC-ENTMCNC: 33.3 GM/DL — SIGNIFICANT CHANGE UP (ref 32–36)
MCHC RBC-ENTMCNC: 33.4 GM/DL — SIGNIFICANT CHANGE UP (ref 32–36)
MCV RBC AUTO: 95.9 FL — SIGNIFICANT CHANGE UP (ref 80–100)
MCV RBC AUTO: 97.2 FL — SIGNIFICANT CHANGE UP (ref 80–100)
MCV RBC AUTO: 99.4 FL — SIGNIFICANT CHANGE UP (ref 80–100)
MONOCYTES # BLD AUTO: 0.71 K/UL — SIGNIFICANT CHANGE UP (ref 0–0.9)
MONOCYTES # BLD AUTO: 0.85 K/UL — SIGNIFICANT CHANGE UP (ref 0–0.9)
MONOCYTES # BLD AUTO: 0.9 K/UL — SIGNIFICANT CHANGE UP (ref 0–0.9)
MONOCYTES NFR BLD AUTO: 5.6 % — SIGNIFICANT CHANGE UP (ref 2–14)
MONOCYTES NFR BLD AUTO: 6.1 % — SIGNIFICANT CHANGE UP (ref 2–14)
MONOCYTES NFR BLD AUTO: 6.8 % — SIGNIFICANT CHANGE UP (ref 2–14)
NEUTROPHILS # BLD AUTO: 8.2 K/UL — HIGH (ref 1.8–7.4)
NEUTROPHILS # BLD AUTO: 8.97 K/UL — HIGH (ref 1.8–7.4)
NEUTROPHILS # BLD AUTO: 9.85 K/UL — HIGH (ref 1.8–7.4)
NEUTROPHILS NFR BLD AUTO: 65.4 % — SIGNIFICANT CHANGE UP (ref 43–77)
NEUTROPHILS NFR BLD AUTO: 66.9 % — SIGNIFICANT CHANGE UP (ref 43–77)
NEUTROPHILS NFR BLD AUTO: 71.1 % — SIGNIFICANT CHANGE UP (ref 43–77)
NITRITE UR-MCNC: NEGATIVE — SIGNIFICANT CHANGE UP
NRBC # BLD: 0 /100 WBCS — SIGNIFICANT CHANGE UP (ref 0–0)
PCO2 BLDV: 53 MMHG — SIGNIFICANT CHANGE UP (ref 42–55)
PH BLDV: 7.37 — SIGNIFICANT CHANGE UP (ref 7.32–7.43)
PH UR: 5.5 — SIGNIFICANT CHANGE UP (ref 5–8)
PLATELET # BLD AUTO: 205 K/UL — SIGNIFICANT CHANGE UP (ref 150–400)
PLATELET # BLD AUTO: 219 K/UL — SIGNIFICANT CHANGE UP (ref 150–400)
PLATELET # BLD AUTO: 240 K/UL — SIGNIFICANT CHANGE UP (ref 150–400)
PO2 BLDV: <35 MMHG — LOW (ref 25–45)
POTASSIUM SERPL-MCNC: 4.1 MMOL/L — SIGNIFICANT CHANGE UP (ref 3.5–5.3)
POTASSIUM SERPL-MCNC: 4.2 MMOL/L — SIGNIFICANT CHANGE UP (ref 3.5–5.3)
POTASSIUM SERPL-SCNC: 4.1 MMOL/L — SIGNIFICANT CHANGE UP (ref 3.5–5.3)
POTASSIUM SERPL-SCNC: 4.2 MMOL/L — SIGNIFICANT CHANGE UP (ref 3.5–5.3)
PROT SERPL-MCNC: 7.2 G/DL — SIGNIFICANT CHANGE UP (ref 6.4–8.2)
PROT SERPL-MCNC: 7.6 G/DL — SIGNIFICANT CHANGE UP (ref 6.4–8.2)
PROT UR-MCNC: NEGATIVE MG/DL — SIGNIFICANT CHANGE UP
RBC # BLD: 4.72 M/UL — SIGNIFICANT CHANGE UP (ref 4.2–5.8)
RBC # BLD: 4.84 M/UL — SIGNIFICANT CHANGE UP (ref 4.2–5.8)
RBC # BLD: 5 M/UL — SIGNIFICANT CHANGE UP (ref 4.2–5.8)
RBC # FLD: 15.2 % — HIGH (ref 10.3–14.5)
RBC # FLD: 15.3 % — HIGH (ref 10.3–14.5)
RBC # FLD: 15.3 % — HIGH (ref 10.3–14.5)
SAO2 % BLDV: 34.1 % — LOW (ref 67–88)
SODIUM SERPL-SCNC: 141 MMOL/L — SIGNIFICANT CHANGE UP (ref 132–145)
SODIUM SERPL-SCNC: 142 MMOL/L — SIGNIFICANT CHANGE UP (ref 132–145)
SP GR SPEC: 1.03 — SIGNIFICANT CHANGE UP (ref 1–1.03)
UROBILINOGEN FLD QL: 1 MG/DL — SIGNIFICANT CHANGE UP (ref 0.2–1)
WBC # BLD: 12.54 K/UL — HIGH (ref 3.8–10.5)
WBC # BLD: 12.62 K/UL — HIGH (ref 3.8–10.5)
WBC # BLD: 14.73 K/UL — HIGH (ref 3.8–10.5)
WBC # FLD AUTO: 12.54 K/UL — HIGH (ref 3.8–10.5)
WBC # FLD AUTO: 12.62 K/UL — HIGH (ref 3.8–10.5)
WBC # FLD AUTO: 14.73 K/UL — HIGH (ref 3.8–10.5)

## 2024-03-18 PROCEDURE — 99285 EMERGENCY DEPT VISIT HI MDM: CPT

## 2024-03-18 PROCEDURE — 93971 EXTREMITY STUDY: CPT | Mod: 26,LT

## 2024-03-18 PROCEDURE — 99223 1ST HOSP IP/OBS HIGH 75: CPT

## 2024-03-18 PROCEDURE — 72131 CT LUMBAR SPINE W/O DYE: CPT | Mod: 26,MC

## 2024-03-18 RX ORDER — GABAPENTIN 400 MG/1
300 CAPSULE ORAL ONCE
Refills: 0 | Status: COMPLETED | OUTPATIENT
Start: 2024-03-18 | End: 2024-03-18

## 2024-03-18 RX ORDER — VANCOMYCIN HCL 1 G
1000 VIAL (EA) INTRAVENOUS ONCE
Refills: 0 | Status: COMPLETED | OUTPATIENT
Start: 2024-03-18 | End: 2024-03-18

## 2024-03-18 RX ORDER — FLUCONAZOLE 150 MG/1
150 TABLET ORAL ONCE
Refills: 0 | Status: COMPLETED | OUTPATIENT
Start: 2024-03-18 | End: 2024-03-18

## 2024-03-18 RX ORDER — NYSTATIN CREAM 100000 [USP'U]/G
1 CREAM TOPICAL ONCE
Refills: 0 | Status: COMPLETED | OUTPATIENT
Start: 2024-03-18 | End: 2024-03-18

## 2024-03-18 RX ORDER — OLANZAPINE 15 MG/1
10 TABLET, FILM COATED ORAL ONCE
Refills: 0 | Status: COMPLETED | OUTPATIENT
Start: 2024-03-18 | End: 2024-03-18

## 2024-03-18 RX ORDER — FLUCONAZOLE 150 MG/1
1 TABLET ORAL
Qty: 1 | Refills: 0
Start: 2024-03-18 | End: 2024-03-21

## 2024-03-18 RX ORDER — CYCLOBENZAPRINE HYDROCHLORIDE 10 MG/1
5 TABLET, FILM COATED ORAL ONCE
Refills: 0 | Status: COMPLETED | OUTPATIENT
Start: 2024-03-18 | End: 2024-03-18

## 2024-03-18 RX ADMIN — FLUCONAZOLE 150 MILLIGRAM(S): 150 TABLET ORAL at 02:47

## 2024-03-18 RX ADMIN — Medication 250 MILLIGRAM(S): at 09:54

## 2024-03-18 RX ADMIN — CYCLOBENZAPRINE HYDROCHLORIDE 5 MILLIGRAM(S): 10 TABLET, FILM COATED ORAL at 22:38

## 2024-03-18 RX ADMIN — NYSTATIN CREAM 1 APPLICATION(S): 100000 CREAM TOPICAL at 22:38

## 2024-03-18 RX ADMIN — Medication 600 MILLIGRAM(S): at 21:51

## 2024-03-18 RX ADMIN — Medication 975 MILLIGRAM(S): at 00:55

## 2024-03-18 RX ADMIN — Medication 100 MILLIGRAM(S): at 21:45

## 2024-03-18 RX ADMIN — GABAPENTIN 300 MILLIGRAM(S): 400 CAPSULE ORAL at 01:54

## 2024-03-18 RX ADMIN — Medication 250 MILLIGRAM(S): at 22:09

## 2024-03-18 RX ADMIN — OLANZAPINE 10 MILLIGRAM(S): 15 TABLET, FILM COATED ORAL at 22:38

## 2024-03-18 RX ADMIN — LIDOCAINE 1 PATCH: 4 CREAM TOPICAL at 00:56

## 2024-03-18 RX ADMIN — Medication 100 MILLIGRAM(S): at 01:48

## 2024-03-18 NOTE — ED ADULT TRIAGE NOTE - CHIEF COMPLAINT QUOTE
Pt. walk in for admission in [facility] for cellulitis. Seen here yesterdays and today and was told to come back.

## 2024-03-18 NOTE — ED ADULT TRIAGE NOTE - CHIEF COMPLAINT QUOTE
Pt walk in c/o pain, numbness and tingling to LLE after "twisting it" it while getting out of bed x3 days ago. Also c/o rash to lower abd. Pt denies dizziness, HA, or any other complaints at this time. Pt aox4, Pt ambulatory w/ steady gait.

## 2024-03-18 NOTE — ED PROVIDER NOTE - PHYSICAL EXAMINATION
GENERAL: Awake. Alert. NAD. Well nourished.  HEENT: NC/AT, PERRL, EOMI, Conjunctiva pink, no scleral icterus. Airway patent. Moist mucous membranes.  LUNGS: CTAB. No wheezes or rales noted.  CARDIAC:  RRR.  ABDOMEN: No masses noted. Soft, NT, ND, no rebound, no guarding.  BACK: No midline spinal tenderness  EXT: No edema, no calf tenderness, distal pulses 2+ bilaterally  NEURO: A&Ox3. Moving all extremities. Sensation and strength intact throughout. No focal deficits.   SKIN: Warm and dry. Erythematous rash at abdomen beneath skin fold and at groin. no crepitus noted, no testicular or perineal involvement.  PSYCH: Normal affect.

## 2024-03-18 NOTE — ED PROVIDER NOTE - OBJECTIVE STATEMENT
53M PMH DM on insulin, HTN, HLD, alcohol use disorder (has not drank for 3 weeks now sp rehab) presenting to the emergency department for admission for IV abx for cellulitis of the lower abdomen and groin, pt seen in ED last night and earlier this morning, was offered admission earlier today, but had to leave and couldn't be admitted at this time, pt now here for admission, no fever, testicular pain, n/v. Patient received a dose of diflucan and IV clindamycin yesterday and a dose of vancomycin at 9am this morning, has not taken additional medication since then. Pt initially presented yesterday for abdominal rash and 2 days of left lower extremity pain and paresthesias, no associated weakness, patient able to ambulate. CT lumbar spine showing lumbar disc herniation. US neg for dvt.

## 2024-03-18 NOTE — ED CDU PROVIDER DISPOSITION NOTE - PATIENT PORTAL LINK FT
You can access the FollowMyHealth Patient Portal offered by [facility] by registering at the following website: [email or web address]. By joining [employer]’s FollowMyHealth portal, you will also be able to view your health information using other applications (apps) compatible with our system.

## 2024-03-18 NOTE — ED PROVIDER NOTE - ATTENDING CONTRIBUTION TO CARE
HPI: diabetic male with cellulitis to lower abdomen extending to b/l thighs, initially on observation and then left AMA because he had to get his computer to work prior to being admitted. Area of cellulitis unimproved with doses of IV abx. will admit. pt agrees to admission. on PE with extensive are of induration, edema, erythema, warmth, malodour beneath abdominal pannus with streaking erythema to b/l upper thighs. groin appears unremarkable.    I performed the initial face to face bedside interview with this patient regarding history of present illness, review of symptoms and past medical, social and family history.  I completed an independent physical examination.  I was the initial provider who evaluated this patient.  The history, review of symptoms and examination was documented by the scribe in my presence and I attest to the accuracy of the documentation.  I have discussed the patient’s plan of care and disposition with the resident.

## 2024-03-18 NOTE — ED PROVIDER NOTE - CLINICAL SUMMARY MEDICAL DECISION MAKING FREE TEXT BOX
53M PMH DM on insulin, HTN, HLD, alcohol use disorder (has not drank for 3 weeks now sp rehab) presenting to the emergency department for US of lower extremity and further evaluation, pt seen in ED overnight for 2 days of left lower extremity pain and paresthesias, no associated weakness, patient able to ambulate, patient reports 2 days of worsening erythematous rash at lower abdomen beneath skin fold, no associated fever, pt wanted to leave and come back for US of lower extremity to eval for DVT. Patient received a dose of diflucan and IV clindamycin for presumed candida with possible overlying cellulitis. Given hx and physical, ddx includes but is not limited to Candida, cellulitis, lumbar radiculopathy, diabetic neuropathy, DVT, low concern for cord involvement (strength intact, ambulating in ED, no focal deficits, no urinary or fecal incontinence no saddle anesthesia).  Plan for CT, ultrasound, reassess.

## 2024-03-18 NOTE — ED CDU PROVIDER INITIAL DAY NOTE - DETAILS
US to r/o DVT, US tech will arrive at 10am. Pt also requires antibiotics and diflucan for skin infection.

## 2024-03-18 NOTE — ED CDU PROVIDER DISPOSITION NOTE - CLINICAL COURSE
Patient admitted for cellulitis. Just after admission. Patient said he wanted to leave AMA. Refused to wait for US report. Awake, alert, oriented x3, gait steady, speech clear. Able to verbalize risks of leaving. Given option to return to the ED at any time without penalty.

## 2024-03-18 NOTE — ED PROVIDER NOTE - ATTENDING CONTRIBUTION TO CARE
I have discussed the case with the resident/mid level provider. I have personally performed a history, physical exam, and my own medical decision making. I have reviewed the note and agree with the findings and plan     Patient returns for repeat visit for Doppler in the setting of elevated D-dimer although age corrected with pain to the left posterior calf.  Please see prior chart for ED visit documentation prior to this visit.  Patient also has paresthesias to left lower extremity consistent with his diabetic neuropathy and past symptoms of diabetic neuropathy.  But will obtain a CT lumbar spine to evaluate for disc herniation.  I have low suspicion for spinal epidural hematoma or cord compression as patient has no bowel or bladder symptoms.  Has no weakness to left lower extremity.  Patient also evaluated for Candida of the abdominal wall and at which time he was given Diflucan.  There is been no change or worsening of his exam and in region of rash.  Will place in observation for Doppler and CT lumbar spine.  White blood cell count is downtrending.  Patient received IV antibiotics    8 AM.  Will sign out to Dr. Allen pending imaging.

## 2024-03-18 NOTE — ED PROVIDER NOTE - CLINICAL SUMMARY MEDICAL DECISION MAKING FREE TEXT BOX
53M PMH DM on insulin, HTN, HLD, alcohol use disorder (has not drank for 3 weeks now sp rehab) presenting to the emergency department for admission for IV abx for cellulitis of the lower abdomen and groin, pt seen in ED last night and earlier this morning, was offered admission earlier today, but had to leave and couldn't be admitted at this time, pt now here for admission, no fever, testicular pain, n/v. Given hx and physical, ddx includes but is not limited to cellulitis, pt high risk for developing ml gangrene with DM but not signs of this at this time, will plan for labs, blood cx, admission for IV abx.

## 2024-03-18 NOTE — ED ADULT NURSE NOTE - OBJECTIVE STATEMENT
Pt presents to ED for admission for cellulitis treatment. States he was here and had to leave for a meeting. Now here for admission for cellulitis of stomach and groin area.

## 2024-03-18 NOTE — ED CDU PROVIDER INITIAL DAY NOTE - PROGRESS NOTE DETAILS
patient to be signed out to dr abrams, pending doppler. patient to be placed in a gown. relayed ct lumbar spine results to patient and will require spine consult. Patient with significant erythema of lower abdomen and right intertriginous area and proximal thigh. Reports rash appeared over the past 2 days and spread rapidly. High risk for developing fornier's gangrene. Will admit for IV antibiotics. I contacted [facility] to initiate admission. Receiving hospitalist asked  call back in 1 hour. I consulted with hospitalist attending Dr. De Jesus, who accepted patient to regional medicine.

## 2024-03-18 NOTE — ED PROVIDER NOTE - PHYSICAL EXAMINATION
GENERAL: Awake. Alert. NAD. Well nourished.  HEENT: NC/AT, Conjunctiva pink, no scleral icterus. Airway patent. Moist mucous membranes.  LUNGS: CTAB. No wheezes or rales noted.  CARDIAC: RRR.  ABDOMEN: No masses noted. Soft, NT, ND, no rebound, no guarding.  NEURO: A&Ox3. Moving all extremities. Sensation and strength intact throughout.  SKIN: Warm and dry. Erythematous rash at lower abdominal skin fold and groin, no crepitus, no testicular or perineal involvement.   PSYCH: Normal affect.

## 2024-03-18 NOTE — ED PROVIDER NOTE - OBJECTIVE STATEMENT
53M PMH DM on insulin, HTN, HLD, alcohol use disorder (has not drank for 3 weeks now sp rehab) presenting to the emergency department for US of lower extremity and further evaluation, pt seen in ED overnight for 2 days of left lower extremity pain and paresthesias, no associated weakness, patient able to ambulate, patient reports 2 days of worsening erythematous rash at lower abdomen beneath skin fold, no associated fever, pt wanted to leave and come back for US of lower extremity to eval for DVT. Patient received a dose of diflucan and IV clindamycin for presumed candida with possible overlying cellulitis.  patient without any new complaints at this time.

## 2024-03-18 NOTE — ED PROVIDER NOTE - NS ED ATTENDING STATEMENT MOD
I have seen and examined this patient and fully participated in the care of this patient as the teaching attending.  The service was shared with the TAYLOR.  I reviewed and verified the documentation.

## 2024-03-18 NOTE — ED ADULT NURSE NOTE - CHIEF COMPLAINT QUOTE
Pt. walk in for admission in [facility] for cellulitis. Seen here yesterdays and today and was told to come back.

## 2024-03-18 NOTE — ED PROVIDER NOTE - PROGRESS NOTE DETAILS
Aleksandra Pedraza DO (PGY3): Spoke with hospitalist at [facility], will admit to medicine for IV antibiotics due to concern for severe candindal infection complicated by progressing cellulitis of the groin. Pt s/p clinda and vanc.

## 2024-03-18 NOTE — ED PROVIDER NOTE - CARE PLAN
1 Principal Discharge DX:	Candidal skin infection  Secondary Diagnosis:	Cellulitis  Secondary Diagnosis:	Neuropathy

## 2024-03-19 VITALS
HEART RATE: 85 BPM | OXYGEN SATURATION: 96 % | TEMPERATURE: 98 F | SYSTOLIC BLOOD PRESSURE: 113 MMHG | RESPIRATION RATE: 17 BRPM | DIASTOLIC BLOOD PRESSURE: 80 MMHG

## 2024-03-19 DIAGNOSIS — E78.5 HYPERLIPIDEMIA, UNSPECIFIED: ICD-10-CM

## 2024-03-19 DIAGNOSIS — N17.9 ACUTE KIDNEY FAILURE, UNSPECIFIED: ICD-10-CM

## 2024-03-19 DIAGNOSIS — F19.10 OTHER PSYCHOACTIVE SUBSTANCE ABUSE, UNCOMPLICATED: ICD-10-CM

## 2024-03-19 DIAGNOSIS — L30.4 ERYTHEMA INTERTRIGO: ICD-10-CM

## 2024-03-19 DIAGNOSIS — Z29.9 ENCOUNTER FOR PROPHYLACTIC MEASURES, UNSPECIFIED: ICD-10-CM

## 2024-03-19 DIAGNOSIS — E11.9 TYPE 2 DIABETES MELLITUS WITHOUT COMPLICATIONS: ICD-10-CM

## 2024-03-19 DIAGNOSIS — F32.9 MAJOR DEPRESSIVE DISORDER, SINGLE EPISODE, UNSPECIFIED: ICD-10-CM

## 2024-03-19 DIAGNOSIS — R53.83 OTHER FATIGUE: ICD-10-CM

## 2024-03-19 DIAGNOSIS — M51.26 OTHER INTERVERTEBRAL DISC DISPLACEMENT, LUMBAR REGION: ICD-10-CM

## 2024-03-19 DIAGNOSIS — I10 ESSENTIAL (PRIMARY) HYPERTENSION: ICD-10-CM

## 2024-03-19 LAB
AMMONIA BLD-MCNC: 33 UMOL/L — SIGNIFICANT CHANGE UP (ref 11–55)
ETHANOL SERPL-MCNC: <10 MG/DL — SIGNIFICANT CHANGE UP (ref 0–10)
GLUCOSE BLDC GLUCOMTR-MCNC: 121 MG/DL — HIGH (ref 70–99)
GLUCOSE BLDC GLUCOMTR-MCNC: 125 MG/DL — HIGH (ref 70–99)
GLUCOSE BLDC GLUCOMTR-MCNC: 133 MG/DL — HIGH (ref 70–99)
GLUCOSE BLDC GLUCOMTR-MCNC: 133 MG/DL — HIGH (ref 70–99)
GLUCOSE BLDC GLUCOMTR-MCNC: 136 MG/DL — HIGH (ref 70–99)
GLUCOSE BLDC GLUCOMTR-MCNC: 139 MG/DL — HIGH (ref 70–99)

## 2024-03-19 PROCEDURE — 99222 1ST HOSP IP/OBS MODERATE 55: CPT | Mod: GC

## 2024-03-19 PROCEDURE — 93010 ELECTROCARDIOGRAM REPORT: CPT

## 2024-03-19 RX ORDER — HUMAN INSULIN 100 [IU]/ML
10 INJECTION, SUSPENSION SUBCUTANEOUS
Refills: 0 | Status: COMPLETED | OUTPATIENT
Start: 2024-03-19 | End: 2024-03-19

## 2024-03-19 RX ORDER — INSULIN LISPRO 100/ML
VIAL (ML) SUBCUTANEOUS
Refills: 0 | Status: DISCONTINUED | OUTPATIENT
Start: 2024-03-19 | End: 2024-03-19

## 2024-03-19 RX ORDER — ACETAMINOPHEN 500 MG
650 TABLET ORAL EVERY 6 HOURS
Refills: 0 | Status: DISCONTINUED | OUTPATIENT
Start: 2024-03-19 | End: 2024-03-20

## 2024-03-19 RX ORDER — DEXTROSE 50 % IN WATER 50 %
25 SYRINGE (ML) INTRAVENOUS ONCE
Refills: 0 | Status: DISCONTINUED | OUTPATIENT
Start: 2024-03-19 | End: 2024-03-20

## 2024-03-19 RX ORDER — INFLUENZA VIRUS VACCINE 15; 15; 15; 15 UG/.5ML; UG/.5ML; UG/.5ML; UG/.5ML
0.5 SUSPENSION INTRAMUSCULAR ONCE
Refills: 0 | Status: DISCONTINUED | OUTPATIENT
Start: 2024-03-19 | End: 2024-03-20

## 2024-03-19 RX ORDER — SODIUM CHLORIDE 9 MG/ML
1000 INJECTION, SOLUTION INTRAVENOUS
Refills: 0 | Status: DISCONTINUED | OUTPATIENT
Start: 2024-03-19 | End: 2024-03-20

## 2024-03-19 RX ORDER — INSULIN LISPRO 100/ML
VIAL (ML) SUBCUTANEOUS
Refills: 0 | Status: DISCONTINUED | OUTPATIENT
Start: 2024-03-19 | End: 2024-03-20

## 2024-03-19 RX ORDER — DEXTROSE 50 % IN WATER 50 %
12.5 SYRINGE (ML) INTRAVENOUS ONCE
Refills: 0 | Status: DISCONTINUED | OUTPATIENT
Start: 2024-03-19 | End: 2024-03-20

## 2024-03-19 RX ORDER — FLUOXETINE HCL 10 MG
60 CAPSULE ORAL DAILY
Refills: 0 | Status: DISCONTINUED | OUTPATIENT
Start: 2024-03-19 | End: 2024-03-20

## 2024-03-19 RX ORDER — LANOLIN ALCOHOL/MO/W.PET/CERES
3 CREAM (GRAM) TOPICAL AT BEDTIME
Refills: 0 | Status: DISCONTINUED | OUTPATIENT
Start: 2024-03-19 | End: 2024-03-20

## 2024-03-19 RX ORDER — GLUCAGON INJECTION, SOLUTION 0.5 MG/.1ML
1 INJECTION, SOLUTION SUBCUTANEOUS ONCE
Refills: 0 | Status: DISCONTINUED | OUTPATIENT
Start: 2024-03-19 | End: 2024-03-20

## 2024-03-19 RX ORDER — ONDANSETRON 8 MG/1
4 TABLET, FILM COATED ORAL EVERY 8 HOURS
Refills: 0 | Status: DISCONTINUED | OUTPATIENT
Start: 2024-03-19 | End: 2024-03-20

## 2024-03-19 RX ORDER — INSULIN LISPRO 100/ML
VIAL (ML) SUBCUTANEOUS AT BEDTIME
Refills: 0 | Status: DISCONTINUED | OUTPATIENT
Start: 2024-03-19 | End: 2024-03-19

## 2024-03-19 RX ORDER — DEXTROSE 50 % IN WATER 50 %
15 SYRINGE (ML) INTRAVENOUS ONCE
Refills: 0 | Status: DISCONTINUED | OUTPATIENT
Start: 2024-03-19 | End: 2024-03-20

## 2024-03-19 RX ADMIN — HUMAN INSULIN 10 UNIT(S): 100 INJECTION, SUSPENSION SUBCUTANEOUS at 09:01

## 2024-03-19 RX ADMIN — Medication 1 APPLICATION(S): at 18:19

## 2024-03-19 RX ADMIN — Medication 3 MILLIGRAM(S): at 23:38

## 2024-03-19 NOTE — H&P ADULT - NSHPPHYSICALEXAM_GEN_ALL_CORE
General: NAD, tired  Head: NC/AT; MMM; PERRL; EOMI;  Neck: Supple; no JVD  Respiratory: CTAB; no wheezes/rales/rhonchi  Cardiovascular: Regular rhythm/rate; S1/S2+, no murmurs, rubs gallops   Gastrointestinal: Soft; NTND; bowel sounds normal and present  Extremities: WWP; no edema/cyanosis, 4/5 strength bilateral lower exts, 5/5 upper exts  Neurological: A&Ox3, CNII-XII grossly intact; no obvious focal deficits  Integument: beefy red lesions on the right aspect of the upper thigh and lower right abdomen, sparing the penis and scrotum

## 2024-03-19 NOTE — H&P ADULT - ASSESSMENT
53M PMH DM on insulin, HTN, HLD, alcohol use disorder (sober 3 weeks s/p rehab) admitted from ED for cellulitis which appears more like intertrigo

## 2024-03-19 NOTE — H&P ADULT - PROBLEM SELECTOR PLAN 4
lumbar herniation w/ patient w/ lower ext weakness    - PT consult  - hold home Gabapentin while lethargic

## 2024-03-19 NOTE — H&P ADULT - PROBLEM SELECTOR PLAN 9
home meds: Fluoxetine 60mg, Zyprexa 10mg    - c/w Fluoxetine  - hold Zyprexa 10mg home meds: Fluoxetine 60mg, Zyprexa 10mg    - c/w Fluoxetine  - hold Zyprexa 10mg given lethargy

## 2024-03-19 NOTE — SBIRT NOTE ADULT - NSSBIRTALCPOSREINDET_GEN_A_CORE
Patient reports that he is connected to an outpatient substance use clinic for his drinking. Clinic: [facility] ([phone]) located at [address] [place] [address].

## 2024-03-19 NOTE — H&P ADULT - NSHPLABSRESULTS_GEN_ALL_CORE
LABS:                          15.5   12.62 )-----------( 219      ( 18 Mar 2024 21:00 )             46.4     03-18    141  |  106  |  15  ----------------------------<  106<H>  4.1   |  25  |  0.97    Ca    9.0      18 Mar 2024 21:00    TPro  7.2  /  Alb  3.0<L>  /  TBili  0.3  /  DBili  x   /  AST  30  /  ALT  27  /  AlkPhos  65  03-18

## 2024-03-19 NOTE — H&P ADULT - ATTENDING COMMENTS
#Intertrigo  #SEAN screening  #Lethargy      -Will c/w ketoconazole. hold abx for now. continue monitoring.   -Elevated STOP BANG. Counselled patient on obtaining sleep study  -Could be multifactorial. Substances; holding zyprexa for now, holding neurontin, remeron. Had discussed with his PCP. Needs better management of his meds which have been adjusted numerous times recently.

## 2024-03-19 NOTE — PATIENT PROFILE ADULT - FALL HARM RISK - HARM RISK INTERVENTIONS

## 2024-03-19 NOTE — H&P ADULT - PROBLEM SELECTOR PLAN 1
beefy red rash beneath pannus of RLQ and right upper thigh. pruritic w/ a burning pain.   white count elevated to 14, now at 12 after patient AMA'd from ED and returned. Received IV antibiotics before AMA and patient returned w/o improvement. No scrotal/penis involvement. area marked.   fungal infection most likely etiology i/s/o diabetes, obesity and pannus.     - Topical antifungal ketoconazole 2% once daily for 2 weeks  - Topical steroid if pruritus becomes severe, at this time not indicated  - consider discharge after patient's

## 2024-03-19 NOTE — PATIENT PROFILE ADULT - FUNCTIONAL SCREEN CURRENT LEVEL: SWALLOWING (IF SCORE 2 OR MORE FOR ANY ITEM, CONSULT REHAB SERVICES), MLM)
----- Message from Chen Genao sent at 2/9/2023  9:08 AM CST -----  Type:  Appointment Request    Name of Caller:ISACC MARADIAGA [[id number]]  When is the first available appointment?No access  Would the patient rather a call back or a response via [facility]? Call back   Best Call Back Number:[phone]  Additional Information: Patient called indicating she would attempted to schedule through her my portal. Patient would like to schedule for the soonest appointment available. Patient indicates she needs to be seen regarding her CPAP machine. Patient indicates she needs to schedule for her 3 month follow up. Please call patient back with more information and further assistance.      0 = swallows foods/liquids without difficulty

## 2024-03-19 NOTE — H&P ADULT - HISTORY OF PRESENT ILLNESS
53M PMH DM on insulin, HTN, HLD, alcohol use disorder (sober 3 weeks s/p rehab) presented to the emergency department for rash of the groin and lower abdomen. ED attending admitted the patient for IV abx for cellulitis. He denies fever, testicular pain, n/v, chills. Patient received a dose of diflucan and IV clindamycin yesterday and a dose of vancomycin over the last 2 days, has not taken additional medication since. He states the rash started 2 days before his initial presentation. He says it presented w/ pruritus and progressed to a thick rash w/ associated burning pain. Denies purulence.   Pt initially presented and 2 days of left lower extremity pain and paresthesias, no associated weakness, patient able to ambulate. Patient states that he is tired after being up all night. Called the patient's PCP Dr. Delia Holt. She states patient baseline WBC 11.8. Baseline Cr 0.8. from earlier in March.

## 2024-03-19 NOTE — H&P ADULT - PROBLEM SELECTOR PLAN 10
F: oral intake  E: replete K>4, Mg >2  N: Consistent Carbs  GI: none  DVT: Improve Score low    Code Status: Full Code    Dispo: F

## 2024-03-20 ENCOUNTER — TRANSCRIPTION ENCOUNTER (OUTPATIENT)
Age: 54
End: 2024-03-20

## 2024-03-20 DIAGNOSIS — B37.9 CANDIDIASIS, UNSPECIFIED: ICD-10-CM

## 2024-03-20 DIAGNOSIS — M79.605 PAIN IN LEFT LEG: ICD-10-CM

## 2024-03-20 DIAGNOSIS — I10 ESSENTIAL (PRIMARY) HYPERTENSION: ICD-10-CM

## 2024-03-20 DIAGNOSIS — E11.40 TYPE 2 DIABETES MELLITUS WITH DIABETIC NEUROPATHY, UNSPECIFIED: ICD-10-CM

## 2024-03-20 DIAGNOSIS — Z88.0 ALLERGY STATUS TO PENICILLIN: ICD-10-CM

## 2024-03-20 DIAGNOSIS — L03.311 CELLULITIS OF ABDOMINAL WALL: ICD-10-CM

## 2024-03-20 DIAGNOSIS — Z91.013 ALLERGY TO SEAFOOD: ICD-10-CM

## 2024-03-20 DIAGNOSIS — T38.3X6A UNDERDOSING OF INSULIN AND ORAL HYPOGLYCEMIC [ANTIDIABETIC] DRUGS, INITIAL ENCOUNTER: ICD-10-CM

## 2024-03-20 DIAGNOSIS — Z79.4 LONG TERM (CURRENT) USE OF INSULIN: ICD-10-CM

## 2024-03-20 DIAGNOSIS — R21 RASH AND OTHER NONSPECIFIC SKIN ERUPTION: ICD-10-CM

## 2024-03-20 DIAGNOSIS — E78.5 HYPERLIPIDEMIA, UNSPECIFIED: ICD-10-CM

## 2024-03-20 DIAGNOSIS — Z91.148 PATIENT'S OTHER NONCOMPLIANCE WITH MEDICATION REGIMEN FOR OTHER REASON: ICD-10-CM

## 2024-03-20 LAB
A1C WITH ESTIMATED AVERAGE GLUCOSE RESULT: 6.2 % — HIGH (ref 4–5.6)
ALBUMIN SERPL ELPH-MCNC: 3.5 G/DL — SIGNIFICANT CHANGE UP (ref 3.3–5)
ALP SERPL-CCNC: 57 U/L — SIGNIFICANT CHANGE UP (ref 40–120)
ALT FLD-CCNC: 21 U/L — SIGNIFICANT CHANGE UP (ref 10–45)
ANION GAP SERPL CALC-SCNC: 9 MMOL/L — SIGNIFICANT CHANGE UP (ref 5–17)
AST SERPL-CCNC: 22 U/L — SIGNIFICANT CHANGE UP (ref 10–40)
BASOPHILS # BLD AUTO: 0.03 K/UL — SIGNIFICANT CHANGE UP (ref 0–0.2)
BASOPHILS NFR BLD AUTO: 0.3 % — SIGNIFICANT CHANGE UP (ref 0–2)
BILIRUB SERPL-MCNC: 0.2 MG/DL — SIGNIFICANT CHANGE UP (ref 0.2–1.2)
BUN SERPL-MCNC: 14 MG/DL — SIGNIFICANT CHANGE UP (ref 7–23)
CALCIUM SERPL-MCNC: 9.3 MG/DL — SIGNIFICANT CHANGE UP (ref 8.4–10.5)
CHLORIDE SERPL-SCNC: 113 MMOL/L — HIGH (ref 96–108)
CHOLEST SERPL-MCNC: 181 MG/DL — SIGNIFICANT CHANGE UP
CO2 SERPL-SCNC: 24 MMOL/L — SIGNIFICANT CHANGE UP (ref 22–31)
CREAT SERPL-MCNC: 1.01 MG/DL — SIGNIFICANT CHANGE UP (ref 0.5–1.3)
EGFR: 89 ML/MIN/1.73M2 — SIGNIFICANT CHANGE UP
EOSINOPHIL # BLD AUTO: 0.59 K/UL — HIGH (ref 0–0.5)
EOSINOPHIL NFR BLD AUTO: 5.5 % — SIGNIFICANT CHANGE UP (ref 0–6)
ESTIMATED AVERAGE GLUCOSE: 131 MG/DL — HIGH (ref 68–114)
GLUCOSE BLDC GLUCOMTR-MCNC: 97 MG/DL — SIGNIFICANT CHANGE UP (ref 70–99)
GLUCOSE BLDC GLUCOMTR-MCNC: 99 MG/DL — SIGNIFICANT CHANGE UP (ref 70–99)
GLUCOSE SERPL-MCNC: 93 MG/DL — SIGNIFICANT CHANGE UP (ref 70–99)
HCT VFR BLD CALC: 44.5 % — SIGNIFICANT CHANGE UP (ref 39–50)
HDLC SERPL-MCNC: 38 MG/DL — LOW
HGB BLD-MCNC: 15.1 G/DL — SIGNIFICANT CHANGE UP (ref 13–17)
IMM GRANULOCYTES NFR BLD AUTO: 0.4 % — SIGNIFICANT CHANGE UP (ref 0–0.9)
LIPID PNL WITH DIRECT LDL SERPL: 120 MG/DL — HIGH
LYMPHOCYTES # BLD AUTO: 2.65 K/UL — SIGNIFICANT CHANGE UP (ref 1–3.3)
LYMPHOCYTES # BLD AUTO: 24.9 % — SIGNIFICANT CHANGE UP (ref 13–44)
MAGNESIUM SERPL-MCNC: 2 MG/DL — SIGNIFICANT CHANGE UP (ref 1.6–2.6)
MCHC RBC-ENTMCNC: 33 PG — SIGNIFICANT CHANGE UP (ref 27–34)
MCHC RBC-ENTMCNC: 33.9 GM/DL — SIGNIFICANT CHANGE UP (ref 32–36)
MCV RBC AUTO: 97.2 FL — SIGNIFICANT CHANGE UP (ref 80–100)
MONOCYTES # BLD AUTO: 0.71 K/UL — SIGNIFICANT CHANGE UP (ref 0–0.9)
MONOCYTES NFR BLD AUTO: 6.7 % — SIGNIFICANT CHANGE UP (ref 2–14)
NEUTROPHILS # BLD AUTO: 6.62 K/UL — SIGNIFICANT CHANGE UP (ref 1.8–7.4)
NEUTROPHILS NFR BLD AUTO: 62.2 % — SIGNIFICANT CHANGE UP (ref 43–77)
NON HDL CHOLESTEROL: 143 MG/DL — HIGH
NRBC # BLD: 0 /100 WBCS — SIGNIFICANT CHANGE UP (ref 0–0)
PHOSPHATE SERPL-MCNC: 3.4 MG/DL — SIGNIFICANT CHANGE UP (ref 2.5–4.5)
PLATELET # BLD AUTO: 200 K/UL — SIGNIFICANT CHANGE UP (ref 150–400)
POTASSIUM SERPL-MCNC: 4.6 MMOL/L — SIGNIFICANT CHANGE UP (ref 3.5–5.3)
POTASSIUM SERPL-SCNC: 4.6 MMOL/L — SIGNIFICANT CHANGE UP (ref 3.5–5.3)
PROT SERPL-MCNC: 6.4 G/DL — SIGNIFICANT CHANGE UP (ref 6–8.3)
RBC # BLD: 4.58 M/UL — SIGNIFICANT CHANGE UP (ref 4.2–5.8)
RBC # FLD: 15.5 % — HIGH (ref 10.3–14.5)
SODIUM SERPL-SCNC: 146 MMOL/L — HIGH (ref 135–145)
TRIGL SERPL-MCNC: 117 MG/DL — SIGNIFICANT CHANGE UP
WBC # BLD: 10.64 K/UL — HIGH (ref 3.8–10.5)
WBC # FLD AUTO: 10.64 K/UL — HIGH (ref 3.8–10.5)

## 2024-03-20 PROCEDURE — 80061 LIPID PANEL: CPT

## 2024-03-20 PROCEDURE — 94660 CPAP INITIATION&MGMT: CPT

## 2024-03-20 PROCEDURE — 96375 TX/PRO/DX INJ NEW DRUG ADDON: CPT

## 2024-03-20 PROCEDURE — 87040 BLOOD CULTURE FOR BACTERIA: CPT

## 2024-03-20 PROCEDURE — 83735 ASSAY OF MAGNESIUM: CPT

## 2024-03-20 PROCEDURE — 82962 GLUCOSE BLOOD TEST: CPT

## 2024-03-20 PROCEDURE — 99239 HOSP IP/OBS DSCHRG MGMT >30: CPT | Mod: GC

## 2024-03-20 PROCEDURE — 93005 ELECTROCARDIOGRAM TRACING: CPT

## 2024-03-20 PROCEDURE — 85025 COMPLETE CBC W/AUTO DIFF WBC: CPT

## 2024-03-20 PROCEDURE — 97161 PT EVAL LOW COMPLEX 20 MIN: CPT

## 2024-03-20 PROCEDURE — 80307 DRUG TEST PRSMV CHEM ANLYZR: CPT

## 2024-03-20 PROCEDURE — 96374 THER/PROPH/DIAG INJ IV PUSH: CPT

## 2024-03-20 PROCEDURE — 82140 ASSAY OF AMMONIA: CPT

## 2024-03-20 PROCEDURE — 36415 COLL VENOUS BLD VENIPUNCTURE: CPT

## 2024-03-20 PROCEDURE — 83036 HEMOGLOBIN GLYCOSYLATED A1C: CPT

## 2024-03-20 PROCEDURE — 80053 COMPREHEN METABOLIC PANEL: CPT

## 2024-03-20 PROCEDURE — 99285 EMERGENCY DEPT VISIT HI MDM: CPT

## 2024-03-20 PROCEDURE — 84100 ASSAY OF PHOSPHORUS: CPT

## 2024-03-20 RX ORDER — KETOCONAZOLE 20 MG/G
1 AEROSOL, FOAM TOPICAL
Qty: 1 | Refills: 1
Start: 2024-03-20 | End: 2024-04-16

## 2024-03-20 RX ORDER — ACETAMINOPHEN 500 MG
2 TABLET ORAL
Qty: 0 | Refills: 0 | DISCHARGE
Start: 2024-03-20

## 2024-03-20 RX ADMIN — Medication 1 APPLICATION(S): at 06:40

## 2024-03-20 NOTE — DISCHARGE NOTE NURSING/CASE MANAGEMENT/SOCIAL WORK - NSDCPEFALRISK_GEN_ALL_CORE
For information on Fall & Injury Prevention, visit: [email or web address] OR  [email or web address] OR  https://www.cdc.gov/steadi/patient.html

## 2024-03-20 NOTE — DISCHARGE NOTE PROVIDER - CARE PROVIDER_API CALL
LUZ MARIA UMANA  [address] [facility] [address]  [place] [address]  Phone: [phone]  Fax: [phone]  Established Patient  Follow Up Time: 1 week

## 2024-03-20 NOTE — DISCHARGE NOTE PROVIDER - NSDCCPCAREPLAN_GEN_ALL_CORE_FT
PRINCIPAL DISCHARGE DIAGNOSIS  Diagnosis: Intertrigo  Assessment and Plan of Treatment:      PRINCIPAL DISCHARGE DIAGNOSIS  Diagnosis: Intertrigo  Assessment and Plan of Treatment: Intertrigo is inflammation of the skin caused by irritation in body folds – especially the armpits, groin, or under the breasts. In many people a yeast called Candida albicans aggravates the condition. This yeast does not normally live on the skin and will not survive on normal dry skin.  However, in warm, moist body folds the yeast infection may develop.  If the skin is weeping: Apply a cold water compress (use a clean washcloth soaked in cold tap water). Leave the compress on for 10-15 minutes. Use the compress twice daily until the rash is gone. The rash usually disappears after 2 to 3 weeks  Please apply ketoconazole cream once daily, can also use clotrimazole cream twice daily. Please follow up with your PCP after discharge.      SECONDARY DISCHARGE DIAGNOSES  Diagnosis: Lethargy  Assessment and Plan of Treatment: You were noted to be lethargic when you presented to the ER, you are on several medications that can make you sleepy. Please avoid zyprexa as it can contribute to these symptoms, follow up with your PCP regarding restarting the medication.    Diagnosis: Obstructive sleep apnea  Assessment and Plan of Treatment: Please use your CPAP machine whenever you are sleeping.

## 2024-03-20 NOTE — CONSULT NOTE ADULT - SUBJECTIVE AND OBJECTIVE BOX
Patient is a 53y old  Male who presents with a chief complaint of rash w/ elevated white count (20 Mar 2024 07:07)      HPI:  53M PMH DM on insulin, HTN, HLD, alcohol use disorder (sober 3 weeks s/p rehab) presented to the emergency department for rash of the groin and lower abdomen. ED attending admitted the patient for IV abx for cellulitis. He denies fever, testicular pain, n/v, chills. Patient received a dose of diflucan and IV clindamycin yesterday and a dose of vancomycin over the last 2 days, has not taken additional medication since. He states the rash started 2 days before his initial presentation. He says it presented w/ pruritus and progressed to a thick rash w/ associated burning pain. Denies purulence.   Pt initially presented and 2 days of left lower extremity pain and paresthesias, no associated weakness, patient able to ambulate. Patient states that he is tired after being up all night. Called the patient's PCP Dr. Delia Holt. She states patient baseline WBC 11.8. Baseline Cr 0.8. from earlier in March.  (19 Mar 2024 10:28)    PAST MEDICAL & SURGICAL HISTORY:  DM (diabetes mellitus)      HTN (hypertension)        MEDICATIONS  (STANDING):  clotrimazole 1% Cream 1 Application(s) Topical every 12 hours  dextrose 5%. 1000 milliLiter(s) (50 mL/Hr) IV Continuous <Continuous>  dextrose 5%. 1000 milliLiter(s) (50 mL/Hr) IV Continuous <Continuous>  dextrose 5%. 1000 milliLiter(s) (100 mL/Hr) IV Continuous <Continuous>  dextrose 50% Injectable 25 Gram(s) IV Push once  dextrose 50% Injectable 12.5 Gram(s) IV Push once  dextrose 50% Injectable 25 Gram(s) IV Push once  dextrose 50% Injectable 25 Gram(s) IV Push once  FLUoxetine 60 milliGRAM(s) Oral daily  glucagon  Injectable 1 milliGRAM(s) IntraMuscular once  glucagon  Injectable 1 milliGRAM(s) IntraMuscular once  influenza   Vaccine 0.5 milliLiter(s) IntraMuscular once  insulin lispro (ADMELOG) corrective regimen sliding scale   SubCutaneous Before meals and at bedtime    MEDICATIONS  (PRN):  acetaminophen     Tablet .. 650 milliGRAM(s) Oral every 6 hours PRN Temp greater or equal to 38C (100.4F), Mild Pain (1 - 3)  aluminum hydroxide/magnesium hydroxide/simethicone Suspension 30 milliLiter(s) Oral every 4 hours PRN Dyspepsia  dextrose Oral Gel 15 Gram(s) Oral once PRN Blood Glucose LESS THAN 70 milliGRAM(s)/deciliter  dextrose Oral Gel 15 Gram(s) Oral once PRN Blood Glucose LESS THAN 70 milliGRAM(s)/deciliter  melatonin 3 milliGRAM(s) Oral at bedtime PRN Insomnia  ondansetron Injectable 4 milliGRAM(s) IV Push every 8 hours PRN Nausea and/or Vomiting            FAMILY HISTORY:      CBC Full  -  ( 20 Mar 2024 05:30 )  WBC Count : 10.64 K/uL  RBC Count : 4.58 M/uL  Hemoglobin : 15.1 g/dL  Hematocrit : 44.5 %  Platelet Count - Automated : 200 K/uL  Mean Cell Volume : 97.2 fl  Mean Cell Hemoglobin : 33.0 pg  Mean Cell Hemoglobin Concentration : 33.9 gm/dL  Auto Neutrophil # : 6.62 K/uL  Auto Lymphocyte # : 2.65 K/uL  Auto Monocyte # : 0.71 K/uL  Auto Eosinophil # : 0.59 K/uL  Auto Basophil # : 0.03 K/uL  Auto Neutrophil % : 62.2 %  Auto Lymphocyte % : 24.9 %  Auto Monocyte % : 6.7 %  Auto Eosinophil % : 5.5 %  Auto Basophil % : 0.3 %      03-20    146<H>  |  113<H>  |  14  ----------------------------<  93  4.6   |  24  |  1.01    Ca    9.3      20 Mar 2024 05:30  Phos  3.4     03-20  Mg     2.0     03-20    TPro  6.4  /  Alb  3.5  /  TBili  0.2  /  DBili  x   /  AST  22  /  ALT  21  /  AlkPhos  57  03-20      Urinalysis Basic - ( 20 Mar 2024 05:30 )    Color: x / Appearance: x / SG: x / pH: x  Gluc: 93 mg/dL / Ketone: x  / Bili: x / Urobili: x   Blood: x / Protein: x / Nitrite: x   Leuk Esterase: x / RBC: x / WBC x   Sq Epi: x / Non Sq Epi: x / Bacteria: x        Radiology :     < from: CT Lumbar Spine No Cont (03.18.24 @ 06:19) >  ACC: [id number] EXAM:  CT LUMBAR SPINE   ORDERED BY: JOHN YEBOAH     PROCEDURE DATE:  03/18/2024          INTERPRETATION:  PROCEDURE: CT Lumbar spine without contrast    INDICATION: Evaluation for herniated disc    TECHNIQUE:  Multiple axial sections were obtained from the thoracolumbar   junction through the sacrum. Sagittal and coronal reformats were obtained   from the axial data set. The images were reviewed in soft tissue and bone   windows.    COMPARISON: None    FINDINGS: There are fivenon-rib-bearing lumbar vertebrae. Alignment of   the lumbar vertebrae is intact without scoliosis or spondylolisthesis.   The vertebral body heights and intervertebral disc spaces are maintained.   There is no fracture of the visualized osseous structures.    At the L1/2 level, there is no disc herniation. There is no spinal canal   stenosis or neural foraminal narrowing.    At the L2/3 level, there is no disc herniation. There is no spinal canal   stenosis or neural foraminal narrowing.    At the L3/4 level, there is a small disc bulge contributing to mild   bilateral neuroforaminal narrowing. There is no spinal canal stenosis.    At the L4/5 level, there is a left paracentral disc herniation with facet   hypertrophy resulting in mild to moderate spinal canal stenosis with left   lateral recess narrowing. There is mild bilateral neural foraminal   narrowing.    At the L5/S1 level, there is a central disc protrusion with mild to   moderate bilateral lateral recess narrowing without stenotic and spinal   canal stenosis or neural foraminal narrowing.    Pelvic lymphadenopathy, measuring up to 1.1 cm in short axis diameter in   a left external iliac lymph node. Multiple exophytic renal lesions   isodense to the renal cortex, measuring upto 2.3 cm in the left upper   pole. Bilateral renal cortical hypodensities, probable cysts. Bowel   anastomosis at the sigmoid colon.    IMPRESSION:  1.  L4-L5 left paracentral disc herniation resulting in mild to moderate   spinal canal stenosis spinal canal stenosis with left lateral recess   narrowing.    2.  L5-S1 Central disc protrusion with mild to moderate bilateral lateral   recess narrowing.    3.  Multiple indeterminate exophytic renal lesions bilaterally, measuring   up to 2.3 cm in the left upper pole. Further evaluation with nonemergent   renal mass protocol CT or MRI is recommended.    4.  Pelvic lymphadenopathy.         Review of Systems : per HPI         Vital Signs Last 24 Hrs  T(C): 36.6 (19 Mar 2024 22:40), Max: 36.9 (19 Mar 2024 12:40)  T(F): 97.9 (19 Mar 2024 22:40), Max: 98.4 (19 Mar 2024 12:40)  HR: 85 (19 Mar 2024 22:40) (77 - 101)  BP: 113/80 (19 Mar 2024 22:40) (113/80 - 131/91)  BP(mean): --  RR: 17 (19 Mar 2024 22:40) (17 - 18)  SpO2: 96% (19 Mar 2024 22:40) (94% - 96%)    Parameters below as of 19 Mar 2024 20:29  Patient On (Oxygen Delivery Method): room air            Physical Exam:   53 y o man lying comfortably in semi Romero's position , awake , alert , no acute complaints     Head: normocephalic , atraumatic    Eyes: PERRLA , EOMI , no nystagmus , sclera anicteric    ENT / FACE: neg nasal discharge , uvula midline , no oropharyngeal erythema / exudate    Neck: supple , negative JVD , negative carotid bruits , no thyromegaly    Chest: CTA bilaterally     Cardiovascular: regular rate and rhythm , neg murmurs / rubs / gallops    Abdomen: soft , non distended , non tender to palpation in all 4 quadrants ,  normal bowel sounds     Extremities: WWP , neg cyanosis /clubbing / edema     Skin:  red lesions on the right aspect of the upper thigh and lower right abdomen     Neurologic Exam:     Alert and oriented  x 3        Motor Exam:        > 4/5 x 4 extremities        Sensation:         intact to light touch x 4 extremities     DTR:           biceps/brachioradialis: equal                            patella/ankle: equal        Gait:  not tested          PM&R Impression: admitted for cellulitis      Recommendations / Plan:       1) Physical / Occupational therapy focusing on therapeutic exercises , equipment evaluation , bed mobility/transfer out of bed evaluation , progressive ambulation with assistive devices prn .    2) Current disposition plan recommendation:   probable d/c home        Qbrexza Counseling:  I discussed with the patient the risks of Qbrexza including but not limited to headache, mydriasis, blurred vision, dry eyes, nasal dryness, dry mouth, dry throat, dry skin, urinary hesitation, and constipation.  Local skin reactions including erythema, burning, stinging, and itching can also occur.

## 2024-03-20 NOTE — DISCHARGE NOTE NURSING/CASE MANAGEMENT/SOCIAL WORK - PATIENT PORTAL LINK FT
You can access the FollowMyHealth Patient Portal offered by [facility] by registering at the following website: [email or web address]. By joining [facility]’s FollowMyHealth portal, you will also be able to view your health information using other applications (apps) compatible with our system.

## 2024-03-20 NOTE — PHYSICAL THERAPY INITIAL EVALUATION ADULT - GROSSLY INTACT, SENSORY
B UE sensation grossly intact to light touch, R LE sensation grossly intact to light touch, L LE sensation intact to light touch except for L5/S1 dermatomes hypersensitive/tingling

## 2024-03-20 NOTE — DISCHARGE NOTE PROVIDER - ATTENDING DISCHARGE PHYSICAL EXAMINATION:
Physical exam:  GENERAL: NAD, lying in bed comfortably  HEAD:  Atraumatic, Normocephalic  EYES: EOMI, PERRLA, conjunctiva and sclera clear  ENT: Moist mucous membranes  NECK: Supple, No JVD  CHEST/LUNG: Clear to auscultation bilaterally; No rales, rhonchi, wheezing, or rubs.  HEART: Regular rate and rhythm; S1+ S2+   ABDOMEN: Bowel sounds present; Soft, Nontender, Nondistended   EXTREMITIES:  2+ Peripheral Pulses, brisk capillary refill. No clubbing, cyanosis, or edema  NERVOUS SYSTEM:  Alert & Oriented , speech clear   MSK: FROM all 4 extremities, full and equal strength  SKIN: red demarcated lesions on the right part upper thigh in groin, sparing the penis and scrotum

## 2024-03-20 NOTE — PHYSICAL THERAPY INITIAL EVALUATION ADULT - RANGE OF MOTION EXAMINATION, REHAB EVAL
l/s flexion/extension ROM WFL *inc LBP and L LE radic sx with flexion, relief from LBP and L LE radic symptoms with extension/bilateral upper extremity ROM was WFL (within functional limits)/bilateral lower extremity ROM was WFL (within functional limits)

## 2024-03-20 NOTE — PHYSICAL THERAPY INITIAL EVALUATION ADULT - PERTINENT HX OF CURRENT PROBLEM, REHAB EVAL
53M PMH DM on insulin, HTN, HLD, alcohol use disorder (sober 3 weeks s/p rehab) admitted from ED for cellulitis which appears more like intertrigo.

## 2024-03-20 NOTE — PHYSICAL THERAPY INITIAL EVALUATION ADULT - GENERAL OBSERVATIONS, REHAB EVAL
Pt received sitting up at EOB in NAD, +HEP leslie, SANTOS Rider cleared Pt for PT evaluation. Pt is agreeable and motivated to participate in PT treatment.

## 2024-03-20 NOTE — DISCHARGE NOTE PROVIDER - HOSPITAL COURSE
53M PMH DM on insulin, HTN, HLD, alcohol use disorder (sober 3 weeks s/p rehab) admitted from ED for cellulitis, found to have intertrigo.     Problem List/Main Diagnoses:   #Intertrigo.   Pt p/w beefy red rash beneath pannus of RLQ and right upper thigh. pruritic w/ a burning pain. White count elevated to 14, now downtrending. Received IV antibiotics before AMA and patient returned w/o improvement. No scrotal/penis involvement. Fungal infection most likely etiology i/s/o diabetes, obesity and pannus.     - Continue topical antifungal ketoconazole 2% once daily for 2 weeks  - Follow up with PCP    #Lethargy.   Patient states he is tired from being up all night, but has been having lethargy since his detox. His PCP Dr. Holt tested him outpatient for ammonia levels which were negative  patient's BMP negative for causes of lethargy. takes zyprexa qhs, mirtazapine though unclear if still taking, and gabapentin, so possible polypharmacy.  STOPBANG score 5 so may have component of SEAN playing a role.  - continue to hold zyprexa  - continue gabapentin 300 TID  - follow up with PCP after discharge    #Substance abuse.   ·  Plan: underwent substance abuse rehab detox. has been sober for 3 weeks.  - f/u outpatient  - c/w naltrexone and acamprosate    #Lumbar disc herniation.   #LLE numbness  Pt reports h/o lumbar herniation, now w/ LLE weakness and numbness x3 days.  - continue gabapentin  - PT outpatient    #SAMANTHA (acute kidney injury).   Improving. baseline Cr. 0.8 per PCP. presented at 1.1  - oral rehydration and PO intake    #DM (diabetes mellitus).   home med: Metformin 500mg and 750mg, victoza  - continue home meds    #HTN (hypertension).   ·  Plan: home med: atenolol 50mg    - c/w home med.    # HLD (hyperlipidemia).     - consider starting atorvastatin outpatient     Problem/Plan - 9:  ·  Problem: Depression, major.   ·  Plan: home meds: Fluoxetine 60mg, Zyprexa 10mg    - c/w Fluoxetine  - hold Zyprexa 10mg given lethargy.      Patient was discharged to: (home/[facility]/acute rehab/hospice, etc. and w/ home health/home PT/RN/home O2)    New medications:   Changes to old medications:  Medications that were stopped:    Items to follow up as outpatient:    Physical exam at the time of discharge:       LABS & STUDIES:   53M PMH DM on insulin, HTN, HLD, alcohol use disorder (sober 3 weeks s/p rehab) admitted from ED for cellulitis, found to have intertrigo.     Problem List/Main Diagnoses:   #Intertrigo.   Pt p/w beefy red rash beneath pannus of RLQ and right upper thigh. pruritic w/ a burning pain. White count elevated to 14, now downtrending. Received IV antibiotics before AMA and patient returned w/o improvement. No scrotal/penis involvement. Fungal infection most likely etiology i/s/o diabetes, obesity and pannus.     - Continue topical antifungal ketoconazole 2% once daily for 2 weeks  - Follow up with PCP    #Lethargy.   Patient states he is tired from being up all night, but has been having lethargy since his detox. His PCP Dr. Holt tested him outpatient for ammonia levels which were negative  patient's BMP negative for causes of lethargy. takes zyprexa qhs, mirtazapine though unclear if still taking, and gabapentin, so possible polypharmacy.  STOPBANG score 5 so may have component of SEAN playing a role.  - continue to hold zyprexa  - continue gabapentin 300 TID  - follow up with PCP after discharge    #Substance abuse.   ·  Plan: underwent substance abuse rehab detox. has been sober for 3 weeks.  - f/u outpatient  - c/w naltrexone and acamprosate    #Lumbar disc herniation.   #LLE numbness  Pt reports h/o lumbar herniation, now w/ LLE weakness and numbness x3 days.  - continue gabapentin  - PT outpatient    #SAMANTHA (acute kidney injury).   Improving. baseline Cr. 0.8 per PCP. presented at 1.1  - oral rehydration and PO intake    #DM (diabetes mellitus).   home med: Metformin 500mg and 750mg, victoza  - continue home meds    #HTN (hypertension).   ·  Plan: home med: atenolol 50mg    - c/w home med.    # HLD (hyperlipidemia).     - consider starting atorvastatin outpatient    #Depression, major.   home meds: Fluoxetine 20mg, fluoxetine 40mg, Zyprexa 15mg, mirtazapine 15mg  - c/w home meds  - f/u outpatient    Patient was discharged to: Home    New medications: Ketoconazole  Changes to old medications: None  Medications that were stopped: zyprexa    Items to follow up as outpatient: PCP    Physical exam at the time of discharge:   General: NAD, tired  Head: NC/AT; MMM; PERRL; EOMI;  Neck: Supple; no JVD  Respiratory: CTAB; no wheezes/rales/rhonchi  Cardiovascular: Regular rhythm/rate; S1/S2+, no murmurs, rubs gallops   Gastrointestinal: Soft; NTND; bowel sounds normal and present  Extremities: WWP; no edema/cyanosis, 4/5 strength bilateral lower exts, 5/5 upper exts  Neurological: A&Ox3, CNII-XII grossly intact; no obvious focal deficits  Integument: beefy red lesions on the right aspect of the upper thigh and lower right abdomen, sparing the penis and scrotum   53M PMH DM on insulin, HTN, HLD, alcohol use disorder (sober 3 weeks s/p rehab) admitted from ED for cellulitis, found to have intertrigo.     Problem List/Main Diagnoses:   #Intertrigo.   Pt p/w beefy red rash beneath pannus of RLQ and right upper thigh. pruritic w/ a burning pain. White count elevated to 14, now downtrending. Received IV antibiotics before AMA and patient returned w/o improvement. No scrotal/penis involvement. Fungal infection most likely etiology i/s/o diabetes, obesity and pannus.     - Continue topical antifungal ketoconazole 2% once daily for 2 weeks  - Follow up with PCP    #Lethargy.   Patient states he is tired from being up all night, but has been having lethargy since his detox. His PCP Dr. Holt tested him outpatient for ammonia levels which were negative  patient's BMP negative for causes of lethargy. takes zyprexa qhs, mirtazapine though unclear if still taking, and gabapentin, so possible polypharmacy.  STOPBANG score 5 so may have component of SEAN playing a role.  - continue to hold zyprexa  - continue gabapentin 300 TID  - follow up with PCP after discharge-- needs to be on cpap at night     #Substance abuse.   ·  Plan: underwent substance abuse rehab detox. has been sober for 3 weeks.  - f/u outpatient  - c/w naltrexone and acamprosate    #Lumbar disc herniation.   #LLE numbness  Pt reports h/o lumbar herniation, now w/ LLE weakness and numbness x3 days.  - continue gabapentin  - PT outpatient    #SAMANTHA (acute kidney injury).   Improving. baseline Cr. 0.8 per PCP. presented at 1.1  - oral rehydration and PO intake    #DM (diabetes mellitus).   home med: Metformin 500mg and 750mg, victoza  - continue home meds    #HTN (hypertension).   ·  Plan: home med: atenolol 50mg    - c/w home med.    # HLD (hyperlipidemia).     - consider starting atorvastatin outpatient    #Depression, major.   home meds: Fluoxetine 20mg, fluoxetine 40mg, Zyprexa 15mg, mirtazapine 15mg  - c/w home meds  - f/u outpatient    Patient was discharged to: Home    New medications: Ketoconazole  Changes to old medications: None  Medications that were stopped: zyprexa    Items to follow up as outpatient: PCP    Physical exam at the time of discharge:   General: NAD, tired  Head: NC/AT; MMM; PERRL; EOMI;  Neck: Supple; no JVD  Respiratory: CTAB; no wheezes/rales/rhonchi  Cardiovascular: Regular rhythm/rate; S1/S2+, no murmurs, rubs gallops   Gastrointestinal: Soft; NTND; bowel sounds normal and present  Extremities: WWP; no edema/cyanosis, 4/5 strength bilateral lower exts, 5/5 upper exts  Neurological: A&Ox3, CNII-XII grossly intact; no obvious focal deficits  Integument: beefy red lesions on the right aspect of the upper thigh and lower right abdomen, sparing the penis and scrotum

## 2024-03-20 NOTE — DISCHARGE NOTE PROVIDER - NSDCMRMEDTOKEN_GEN_ALL_CORE_FT
clindamycin 300 mg oral capsule: 1 cap(s) orally every 6 hours  clotrimazole 1% topical cream: Apply topically to affected area 2 times a day  Diflucan 150 mg oral tablet: 1 tab(s) orally once a week   acamprosate 333 mg oral delayed release tablet: 2 tab(s) orally  acetaminophen 325 mg oral tablet: 2 tab(s) orally every 6 hours As needed Temp greater or equal to 38C (100.4F), Mild Pain (1 - 3)  atenolol 50 mg oral tablet: 1 tab(s) orally  clotrimazole 1% topical cream: 1 Apply topically to affected area every 12 hours  FLUoxetine 20 mg oral capsule: 1 cap(s) orally once a day  FLUoxetine 40 mg oral capsule: 1 cap(s) orally  gabapentin 300 mg oral capsule: 1 cap(s) orally every 8 hours  MetFORMIN (Eqv-Fortamet) 500 mg oral tablet, extended release: 1 tab(s) orally once a day  metFORMIN 750 mg oral tablet, extended release: 1 tab(s) orally once a day  mirtazapine 15 mg oral tablet: 1 tab(s) orally  naltrexone 50 mg oral tablet: 1 tab(s) orally  Victoza 18 mg/3 mL subcutaneous solution: 1.2 milligram(s) subcutaneously   acamprosate 333 mg oral delayed release tablet: 2 tab(s) orally  acetaminophen 325 mg oral tablet: 2 tab(s) orally every 6 hours As needed Temp greater or equal to 38C (100.4F), Mild Pain (1 - 3)  atenolol 50 mg oral tablet: 1 tab(s) orally  clotrimazole 1% topical cream: 1 Apply topically to affected area every 12 hours  FLUoxetine 20 mg oral capsule: 1 cap(s) orally once a day  FLUoxetine 40 mg oral capsule: 1 cap(s) orally once a day  gabapentin 300 mg oral capsule: 1 cap(s) orally every 8 hours  ketoconazole 2% topical cream: Apply topically to affected area once a day for 2 weeks  MetFORMIN (Eqv-Fortamet) 500 mg oral tablet, extended release: 1 tab(s) orally once a day  metFORMIN 750 mg oral tablet, extended release: 1 tab(s) orally once a day  mirtazapine 15 mg oral tablet: 1 tab(s) orally once a day  naltrexone 50 mg oral tablet: 1 tab(s) orally  Victoza 18 mg/3 mL subcutaneous solution: 1.2 milligram(s) subcutaneously

## 2024-03-20 NOTE — PHYSICAL THERAPY INITIAL EVALUATION ADULT - GAIT DEVIATIONS NOTED, PT EVAL
inc lateral sway, mild unsteadiness with no AD improved with use of SC, R lateral shift/decreased erika/decreased step length

## 2024-03-20 NOTE — PHYSICAL THERAPY INITIAL EVALUATION ADULT - MANUAL MUSCLE TESTING RESULTS, REHAB EVAL
B UE grossly 3+/5 throughout assessed through functional mobility, R LE strength grossly 5/5; L hip flexion 4+/5, L knee extension 4+/5, L ankle dorsiflexion 4-/5, L great toe extension 4-/5, L knee flexion 4-/5, L plantarflexion 4-/5

## 2024-03-20 NOTE — CONSULT NOTE ADULT - ASSESSMENT
I M    53 y o M PMH DM on insulin, HTN, HLD, alcohol use disorder (sober 3 weeks s/p rehab) admitted from ED for cellulitis which appears more like intertrigo    Problem/Plan - 1:  ·  Problem: Intertrigo.   ·  Plan: beefy red rash beneath pannus of RLQ and right upper thigh. pruritic w/ a burning pain.   white count elevated to 14, now at 12 after patient AMA'd from ED and returned. Received IV antibiotics before AMA and patient returned w/o improvement. No scrotal/penis involvement. area marked.   fungal infection most likely etiology i/s/o diabetes, obesity and pannus.     - Topical antifungal ketoconazole 2% once daily for 2 weeks  - Topical steroid if pruritus becomes severe, at this time not indicated  - consider discharge after patient's.    Problem/Plan - 2:  ·  Problem: Lethargy.   ·  Plan: patient states he is tired from being up all night, but has been having lethargy since his detox. His PCP Dr. Holt tested him outpatient for ammonia levels which were negative  patient's BMP negative for causes of lethargy. takes zyprexa qhs, mirtazapine though unclear if still taking, and gabapentin, so possible polypharmacy as DDx.  STOPBANG score 5 so may have component of SEAN playing a role.    - monitor after patient sleeps  - obtain Utox  - obtain Serum EtOH  - Serum Ammonia  - hold zyprexa  - hold gabapentin.    Problem/Plan - 3:  ·  Problem: Substance abuse.   ·  Plan: underwent substance abuse rehab detox. has been sober for 3 weeks.    - EtOH level  - Utox.    Problem/Plan - 4:  ·  Problem: Lumbar disc herniation.   ·  Plan: lumbar herniation w/ patient w/ lower ext weakness    - PT consult  - hold home Gabapentin while lethargic.    Problem/Plan - 5:  ·  Problem: SAMANTHA (acute kidney injury).   ·  Plan: baseline Cr. 0.8 per PCP. presented at 1.1    - oral rehydration.    Problem/Plan - 6:  ·  Problem: DM (diabetes mellitus).   ·  Plan: home med: Metformin 750mg    - iSS  - obtain A1c  - hold Gabapentin 300mg TID.    Problem/Plan - 7:  ·  Problem: HTN (hypertension).   ·  Plan: home med: atenolol 80mg    - c/w home med.    Problem/Plan - 8:  ·  Problem: HLD (hyperlipidemia).   ·  Plan: no home meds    - lipid profile.    Problem/Plan - 9:  ·  Problem: Depression, major.   ·  Plan: home meds: Fluoxetine 60mg, Zyprexa 10mg    - c/w Fluoxetine  - hold Zyprexa 10mg given lethargy.    Problem/Plan - 10:  ·  Problem: Preventive measure.   ·  Plan; F: oral intake  E: replete K>4, Mg >2  N: Consistent Carbs  GI: none  DVT: Improve Score low    Code Status: Full Code    Dispo: RMF.

## 2024-03-21 DIAGNOSIS — E78.5 HYPERLIPIDEMIA, UNSPECIFIED: ICD-10-CM

## 2024-03-21 DIAGNOSIS — E11.40 TYPE 2 DIABETES MELLITUS WITH DIABETIC NEUROPATHY, UNSPECIFIED: ICD-10-CM

## 2024-03-21 DIAGNOSIS — L03.311 CELLULITIS OF ABDOMINAL WALL: ICD-10-CM

## 2024-03-21 DIAGNOSIS — Z91.013 ALLERGY TO SEAFOOD: ICD-10-CM

## 2024-03-21 DIAGNOSIS — Z88.0 ALLERGY STATUS TO PENICILLIN: ICD-10-CM

## 2024-03-21 DIAGNOSIS — R21 RASH AND OTHER NONSPECIFIC SKIN ERUPTION: ICD-10-CM

## 2024-03-21 DIAGNOSIS — B37.2 CANDIDIASIS OF SKIN AND NAIL: ICD-10-CM

## 2024-03-21 DIAGNOSIS — I10 ESSENTIAL (PRIMARY) HYPERTENSION: ICD-10-CM

## 2024-03-21 DIAGNOSIS — M79.89 OTHER SPECIFIED SOFT TISSUE DISORDERS: ICD-10-CM

## 2024-03-21 DIAGNOSIS — F10.90 ALCOHOL USE, UNSPECIFIED, UNCOMPLICATED: ICD-10-CM

## 2024-03-22 RX ORDER — GABAPENTIN 400 MG/1
1 CAPSULE ORAL
Refills: 0 | DISCHARGE

## 2024-03-22 RX ORDER — ACAMPROSATE CALCIUM 333 MG/1
2 TABLET, DELAYED RELEASE ORAL
Refills: 0 | DISCHARGE

## 2024-03-22 RX ORDER — METFORMIN HYDROCHLORIDE 850 MG/1
1 TABLET ORAL
Refills: 0 | DISCHARGE

## 2024-03-22 RX ORDER — ATENOLOL 25 MG/1
1 TABLET ORAL
Refills: 0 | DISCHARGE

## 2024-03-22 RX ORDER — NALTREXONE HYDROCHLORIDE 50 MG/1
1 TABLET, FILM COATED ORAL
Refills: 0 | DISCHARGE

## 2024-03-22 RX ORDER — FLUOXETINE HCL 10 MG
1 CAPSULE ORAL
Qty: 0 | Refills: 0 | DISCHARGE

## 2024-03-22 RX ORDER — LIRAGLUTIDE 6 MG/ML
1.2 INJECTION SUBCUTANEOUS
Refills: 0 | DISCHARGE

## 2024-03-22 RX ORDER — OLANZAPINE 15 MG/1
1 TABLET, FILM COATED ORAL
Refills: 0 | DISCHARGE

## 2024-03-22 RX ORDER — MIRTAZAPINE 45 MG/1
1 TABLET, ORALLY DISINTEGRATING ORAL
Qty: 0 | Refills: 0 | DISCHARGE

## 2024-03-24 LAB
CULTURE RESULTS: SIGNIFICANT CHANGE UP
CULTURE RESULTS: SIGNIFICANT CHANGE UP
SPECIMEN SOURCE: SIGNIFICANT CHANGE UP
SPECIMEN SOURCE: SIGNIFICANT CHANGE UP

## 2024-03-28 DIAGNOSIS — Z41.8 ENCOUNTER FOR OTHER PROCEDURES FOR PURPOSES OTHER THAN REMEDYING HEALTH STATE: ICD-10-CM

## 2024-03-28 DIAGNOSIS — T43.595A ADVERSE EFFECT OF OTHER ANTIPSYCHOTICS AND NEUROLEPTICS, INITIAL ENCOUNTER: ICD-10-CM

## 2024-03-28 DIAGNOSIS — Z79.4 LONG TERM (CURRENT) USE OF INSULIN: ICD-10-CM

## 2024-03-28 DIAGNOSIS — E65 LOCALIZED ADIPOSITY: ICD-10-CM

## 2024-03-28 DIAGNOSIS — E66.9 OBESITY, UNSPECIFIED: ICD-10-CM

## 2024-03-28 DIAGNOSIS — M51.26 OTHER INTERVERTEBRAL DISC DISPLACEMENT, LUMBAR REGION: ICD-10-CM

## 2024-03-28 DIAGNOSIS — N17.9 ACUTE KIDNEY FAILURE, UNSPECIFIED: ICD-10-CM

## 2024-03-28 DIAGNOSIS — Z79.85 LONG-TERM (CURRENT) USE OF INJECTABLE NON-INSULIN ANTIDIABETIC DRUGS: ICD-10-CM

## 2024-03-28 DIAGNOSIS — G47.33 OBSTRUCTIVE SLEEP APNEA (ADULT) (PEDIATRIC): ICD-10-CM

## 2024-03-28 DIAGNOSIS — Z88.0 ALLERGY STATUS TO PENICILLIN: ICD-10-CM

## 2024-03-28 DIAGNOSIS — Z79.84 LONG TERM (CURRENT) USE OF ORAL HYPOGLYCEMIC DRUGS: ICD-10-CM

## 2024-03-28 DIAGNOSIS — Z91.013 ALLERGY TO SEAFOOD: ICD-10-CM

## 2024-03-28 DIAGNOSIS — I10 ESSENTIAL (PRIMARY) HYPERTENSION: ICD-10-CM

## 2024-03-28 DIAGNOSIS — E11.9 TYPE 2 DIABETES MELLITUS WITHOUT COMPLICATIONS: ICD-10-CM
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
## 2024-03-28 DIAGNOSIS — E78.5 HYPERLIPIDEMIA, UNSPECIFIED: ICD-10-CM

## 2024-03-28 DIAGNOSIS — F10.90 ALCOHOL USE, UNSPECIFIED, UNCOMPLICATED: ICD-10-CM

## 2024-03-28 DIAGNOSIS — L30.4 ERYTHEMA INTERTRIGO: ICD-10-CM

## 2024-03-28 DIAGNOSIS — D72.829 ELEVATED WHITE BLOOD CELL COUNT, UNSPECIFIED: ICD-10-CM

## 2024-03-28 DIAGNOSIS — F32.9 MAJOR DEPRESSIVE DISORDER, SINGLE EPISODE, UNSPECIFIED: ICD-10-CM

## 2024-03-28 DIAGNOSIS — R53.83 OTHER FATIGUE: ICD-10-CM
